# Patient Record
Sex: MALE | Race: WHITE | Employment: OTHER | URBAN - METROPOLITAN AREA
[De-identification: names, ages, dates, MRNs, and addresses within clinical notes are randomized per-mention and may not be internally consistent; named-entity substitution may affect disease eponyms.]

---

## 2017-01-10 ENCOUNTER — TELEPHONE (OUTPATIENT)
Dept: SURGERY | Facility: CLINIC | Age: 62
End: 2017-01-10

## 2017-01-10 ENCOUNTER — PRE VISIT (OUTPATIENT)
Dept: SURGERY | Facility: CLINIC | Age: 62
End: 2017-01-10

## 2017-01-10 ENCOUNTER — OFFICE VISIT (OUTPATIENT)
Dept: FAMILY MEDICINE | Facility: CLINIC | Age: 62
End: 2017-01-10
Payer: COMMERCIAL

## 2017-01-10 ENCOUNTER — OFFICE VISIT (OUTPATIENT)
Dept: SURGERY | Facility: CLINIC | Age: 62
End: 2017-01-10

## 2017-01-10 VITALS
OXYGEN SATURATION: 96 % | HEIGHT: 65 IN | WEIGHT: 207 LBS | HEART RATE: 86 BPM | DIASTOLIC BLOOD PRESSURE: 82 MMHG | TEMPERATURE: 98.3 F | SYSTOLIC BLOOD PRESSURE: 122 MMHG | BODY MASS INDEX: 34.49 KG/M2

## 2017-01-10 VITALS
SYSTOLIC BLOOD PRESSURE: 122 MMHG | HEART RATE: 86 BPM | DIASTOLIC BLOOD PRESSURE: 82 MMHG | OXYGEN SATURATION: 96 % | WEIGHT: 207 LBS | HEIGHT: 65 IN | BODY MASS INDEX: 34.49 KG/M2 | TEMPERATURE: 98.3 F

## 2017-01-10 DIAGNOSIS — K64.5 THROMBOSED EXTERNAL HEMORRHOIDS: Primary | ICD-10-CM

## 2017-01-10 PROCEDURE — 99213 OFFICE O/P EST LOW 20 MIN: CPT | Performed by: FAMILY MEDICINE

## 2017-01-10 ASSESSMENT — PAIN SCALES - GENERAL: PAINLEVEL: EXTREME PAIN (8)

## 2017-01-10 NOTE — MR AVS SNAPSHOT
After Visit Summary   1/10/2017    Sabino Espinal    MRN: [id number]           Patient Information     Date Of Birth          1955        Visit Information        Provider Department      1/10/2017 2:40 PM Cm Salmon MD [facility]        Today's Diagnoses     Thrombosed external hemorrhoids    -  1       Care Instructions      Thrombosed Hemorrhoids    Hemorrhoids are swollen veins in the lower rectum and anus. They're similar to varicose veins that form in the legs. Hemorrhoids can occur inside the rectum (internal hemorrhoids). Or one may form at the anal opening (external hemorrhoids). Although they may bleed, most hemorrhoids aren't cause for concern. But a small blood clot (thrombus) can develop in an external hemorrhoid. This may lead to severe pain and sometimes bleeding.  When to Go to the Emergency Room (ER)  If you have severe pain or excessive bleeding, seek immediate medical care.  What to Expect in the ER  A doctor is likely to check your anus and rectum using a slender, lighted tube (anoscope or proctoscope). A local anesthetic is given to ease any discomfort.  Treatment    If the blood clot has formed within the past 48 to 72 hours, your doctor may remove it from within the hemorrhoid. This is a simple procedure that can relieve pain. You will have a local anesthetic to keep you pain-free during the procedure. A small incision is made in the skin, and the blood clot is removed. Stitches are generally not needed.    If more than 72 hours have passed, your doctor will suggest home treatments. Simple home treatments can relieve your pain. These may include warm baths, ointments, suppositories, and witch hazel compresses. Many thrombosed hemorrhoids go away on their own in a few weeks.    If you have persistent bleeding or painful hemorrhoids, talk to your doctor about possible treatment with banding, ligation, or removal (hemorrhoidectomy).  Tips for  "Preventing Hemorrhoids    Eat foods that are high in fiber and use fiber supplements to help prevent constipation.    Drink plenty of liquids.    Get regular exercise to help prevent constipation and promote good bowel function.     4010-0348 [employer]. [address] [place] [address], [place] [address]. All rights reserved. This information is not intended as a substitute for professional medical care. Always follow your healthcare professional's instructions.              Follow-ups after your visit        Who to contact     If you have questions or need follow up information about today's clinic visit or your schedule please contact [facility] directly at [phone].  Normal or non-critical lab and imaging results will be communicated to you by MyChart, letter or phone within 4 business days after the clinic has received the results. If you do not hear from us within 7 days, please contact the clinic through [employer]hart or phone. If you have a critical or abnormal lab result, we will notify you by phone as soon as possible.  Submit refill requests through kontoblick or call your pharmacy and they will forward the refill request to us. Please allow 3 business days for your refill to be completed.          Additional Information About Your Visit        4TechharBlendagram Information     kontoblick lets you send messages to your doctor, view your test results, renew your prescriptions, schedule appointments and more. To sign up, go to [email or web address] . Click on \"Log in\" on the left side of the screen, which will take you to the Welcome page. Then click on \"Sign up Now\" on the right side of the page.     You will be asked to enter the access code listed below, as well as some personal information. Please follow the directions to create your username and password.     Your access code is: [email or web address]  Expires: 4/10/2017  3:01 PM     Your access code will  in 90 days. If you need help or a new code, " "please call your [place] clinic or [phone].        Care EveryWhere ID     This is your Care EveryWhere ID. This could be used by other organizations to access your [place] medical records  [id number]        Your Vitals Were     Pulse Temperature Height BMI (Body Mass Index) Pulse Oximetry       86 98.3  F (36.8  C) (Oral) 5' 5\" (1.651 m) 34.45 kg/m2 96%        Blood Pressure from Last 3 Encounters:   01/10/17 122/82   04/11/16 143/89   02/29/16 126/86    Weight from Last 3 Encounters:   01/10/17 207 lb (93.895 kg)   02/29/16 205 lb 12 oz (93.328 kg)   02/08/16 204 lb 12 oz (92.874 kg)              Today, you had the following     No orders found for display         Today's Medication Changes          These changes are accurate as of: 1/10/17  3:01 PM.  If you have any questions, ask your nurse or doctor.               Start taking these medicines.        Dose/Directions    COMPOUND - PHARMACY TO MIX COMPOUNDED MEDICATION   Commonly known as:  CMPD RX   Used for:  Thrombosed external hemorrhoids   Started by:  Cm Salmon MD        Dose:  1 applicator   Place 1 applicator rectally 2 times daily   Quantity:  30 g   Refills:  0         These medicines have changed or have updated prescriptions.        Dose/Directions    * HYDROCHLOROTHIAZIDE PO   This may have changed:  Another medication with the same name was changed. Make sure you understand how and when to take each.   Changed by:  Cm Salmon MD        Dose:  50 mg   Take 50 mg by mouth daily   Refills:  0       * hydrochlorothiazide 50 MG tablet   Commonly known as:  HYDRODIURIL   This may have changed:  how much to take   Used for:  Hypertension goal BP (blood pressure) < 140/90   Changed by:  Cm Salmon MD        Dose:  50 mg   Take 1 tablet (50 mg) by mouth daily   Quantity:  90 tablet   Refills:  3       * hydrochlorothiazide 25 MG tablet   Commonly known as:  HYDRODIURIL   This may have changed:  Another " medication with the same name was changed. Make sure you understand how and when to take each.   Used for:  Basal cell carcinoma of right cheek   Changed by:  Jacquelyn Blakely MD        Dose:  25 mg   Take 25 mg by mouth   Refills:  0       * Notice:  This list has 3 medication(s) that are the same as other medications prescribed for you. Read the directions carefully, and ask your doctor or other care provider to review them with you.         Where to get your medicines      These medications were sent to [facility] [address]  [address] [place], [place] [address] [place] [address]     Phone:  [phone]    - COMPOUND - PHARMACY TO MIX COMPOUNDED MEDICATION             Primary Care Provider Office Phone # Fax #    Cm Zaria Salmon [phone]       [address] [address]        Thank you!     Thank you for choosing [facility]  for your care. Our goal is always to provide you with excellent care. Hearing back from our patients is one way we can continue to improve our services. Please take a few minutes to complete the written survey that you may receive in the mail after your visit with us. Thank you!             Your Updated Medication List - Protect others around you: Learn how to safely use, store and throw away your medicines at www.disposemymeds.org.          This list is accurate as of: 1/10/17  3:01 PM.  Always use your most recent med list.                   Brand Name Dispense Instructions for use    aspirin 81 MG tablet      Take 81 mg by mouth daily       COMPOUND - PHARMACY TO MIX COMPOUNDED MEDICATION    CMPD RX    30 g    Place 1 applicator rectally 2 times daily       * HYDROCHLOROTHIAZIDE PO      Take 50 mg by mouth daily       * hydrochlorothiazide 50 MG tablet    HYDRODIURIL    90 tablet    Take 1 tablet (50 mg) by mouth daily       * hydrochlorothiazide 25 MG tablet    HYDRODIURIL      Take 25 mg by mouth       LANsoprazole 30 MG CR capsule    PREVACID         TOPIRAMATE PO      Take 50 mg by mouth 2 times daily       * Notice:  This list has 3 medication(s) that are the same as other medications prescribed for you. Read the directions carefully, and ask your doctor or other care provider to review them with you.

## 2017-01-10 NOTE — NURSING NOTE
"Chief Complaint   Patient presents with     Consult     Thrombosed hemorrhoid        Filed Vitals:    01/10/17 1615   BP: 122/82   Pulse: 86   Temp: 98.3  F (36.8  C)   TempSrc: Oral   Height: 5' 5\"   Weight: 207 lb   SpO2: 96%       Body mass index is 34.45 kg/(m^2).    Debbie Jarvis CMA                                                      "

## 2017-01-10 NOTE — MR AVS SNAPSHOT
After Visit Summary   1/10/2017    Sabino Espinal    MRN: [id number]           Patient Information     Date Of Birth          1955        Visit Information        Provider Department      1/10/2017 4:15 PM Susy Carson APRN CNP Colon and Rectal Surgery        Care Instructions    1. Citrucel or Metamucil 2-3 times a day  2. Add a laxative in addition as needed for constipation  3. Warm tub baths 3-4 times a day  4. Topical lidocaine as needed  5. Return to clinic if still symptomatic in 3-4 weeks or if symptoms worsen at any time        Follow-ups after your visit        Who to contact     Please call your clinic at [phone] to:    Ask questions about your health    Make or cancel appointments    Discuss your medicines    Learn about your test results    Speak to your doctor   If you have compliments or concerns about an experience at your clinic, or if you wish to file a complaint, please contact [facility] Patient Relations at [phone] or email us at [email or web address]         Additional Information About Your Visit        "[employer]hart Information     [employer] is an electronic gateway that provides easy, online access to your medical records. With [employer], you can request a clinic appointment, read your test results, renew a prescription or communicate with your care team.     To sign up for [employer] visit the website at [email or web address]   You will be asked to enter the access code listed below, as well as some personal information. Please follow the directions to create your username and password.     Your access code is: [email or web address]  Expires: 4/10/2017  3:01 PM     Your access code will  in 90 days. If you need help or a new code, please contact your [facility] or call [phone] for assistance.        Care EveryWhere ID     This is your Care EveryWhere ID. This could be used by other  "organizations to access your [place] medical records  [id number]        Your Vitals Were     Pulse Temperature Height BMI (Body Mass Index) Pulse Oximetry       86 98.3  F (36.8  C) (Oral) 5' 5\" 34.45 kg/m2 96%        Blood Pressure from Last 3 Encounters:   01/10/17 122/82   01/10/17 122/82   04/11/16 143/89    Weight from Last 3 Encounters:   01/10/17 207 lb   01/10/17 207 lb   02/29/16 205 lb 12 oz              Today, you had the following     No orders found for display         Today's Medication Changes          These changes are accurate as of: 1/10/17  4:53 PM.  If you have any questions, ask your nurse or doctor.               Start taking these medicines.        Dose/Directions    COMPOUND - PHARMACY TO MIX COMPOUNDED MEDICATION   Commonly known as:  CMPD RX   Used for:  Thrombosed external hemorrhoids   Started by:  Cm Salmon MD        Dose:  1 applicator   Place 1 applicator rectally 2 times daily   Quantity:  30 g   Refills:  0         These medicines have changed or have updated prescriptions.        Dose/Directions    hydrochlorothiazide 25 MG tablet   Commonly known as:  HYDRODIURIL   This may have changed:  Another medication with the same name was removed. Continue taking this medication, and follow the directions you see here.   Used for:  Basal cell carcinoma of right cheek   Changed by:  Jacquelyn Blakely MD        Dose:  25 mg   Take 25 mg by mouth   Refills:  0         Stop taking these medicines if you haven't already. Please contact your care team if you have questions.     LANsoprazole 30 MG CR capsule   Commonly known as:  PREVACID   Stopped by:  Susy Carson APRN CNP                Where to get your medicines      These medications were sent to [facility] [place], [place] - [address]  [address], [facility] [address]     Phone:  [phone]    - COMPOUND - PHARMACY TO MIX COMPOUNDED MEDICATION             Primary Care " Provider Office Phone # Fax #    Cm Zaria Salmon [phone]       [address] [address]        Thank you!     Thank you for choosing COLON [facility] RECTAL SURGERY  for your care. Our goal is always to provide you with excellent care. Hearing back from our patients is one way we can continue to improve our services. Please take a few minutes to complete the written survey that you may receive in the mail after your visit with us. Thank you!             Your Updated Medication List - Protect others around you: Learn how to safely use, store and throw away your medicines at www.disposemymeds.org.          This list is accurate as of: 1/10/17  4:53 PM.  Always use your most recent med list.                   Brand Name Dispense Instructions for use    aspirin 81 MG tablet      Take 81 mg by mouth daily       COMPOUND - PHARMACY TO MIX COMPOUNDED MEDICATION    CMPD RX    30 g    Place 1 applicator rectally 2 times daily       hydrochlorothiazide 25 MG tablet    HYDRODIURIL     Take 25 mg by mouth       TOPIRAMATE PO      Take 50 mg by mouth 2 times daily

## 2017-01-10 NOTE — PATIENT INSTRUCTIONS
Thrombosed Hemorrhoids    Hemorrhoids are swollen veins in the lower rectum and anus. They're similar to varicose veins that form in the legs. Hemorrhoids can occur inside the rectum (internal hemorrhoids). Or one may form at the anal opening (external hemorrhoids). Although they may bleed, most hemorrhoids aren't cause for concern. But a small blood clot (thrombus) can develop in an external hemorrhoid. This may lead to severe pain and sometimes bleeding.  When to Go to the Emergency Room (ER)  If you have severe pain or excessive bleeding, seek immediate medical care.  What to Expect in the ER  A doctor is likely to check your anus and rectum using a slender, lighted tube (anoscope or proctoscope). A local anesthetic is given to ease any discomfort.  Treatment    If the blood clot has formed within the past 48 to 72 hours, your doctor may remove it from within the hemorrhoid. This is a simple procedure that can relieve pain. You will have a local anesthetic to keep you pain-free during the procedure. A small incision is made in the skin, and the blood clot is removed. Stitches are generally not needed.    If more than 72 hours have passed, your doctor will suggest home treatments. Simple home treatments can relieve your pain. These may include warm baths, ointments, suppositories, and witch hazel compresses. Many thrombosed hemorrhoids go away on their own in a few weeks.    If you have persistent bleeding or painful hemorrhoids, talk to your doctor about possible treatment with banding, ligation, or removal (hemorrhoidectomy).  Tips for Preventing Hemorrhoids    Eat foods that are high in fiber and use fiber supplements to help prevent constipation.    Drink plenty of liquids.    Get regular exercise to help prevent constipation and promote good bowel function.     9355-9417 [employer]. [address] [place] [address], [place] [address]. All rights reserved. This information is not intended as a  substitute for professional medical care. Always follow your healthcare professional's instructions.

## 2017-01-10 NOTE — PROGRESS NOTES
SUBJECTIVE:                                                    Sabino Espinal is a 61 year old male who presents to clinic today for the following health issues:    Hemorrhoids      Duration: 1 week     Description:   Pain: YES- severe  Itching: YES- severe    Accompanying signs and symptoms:   Blood in stool: YES  Changes in stool pattern: no     History (similar episodes/previous evaluation): yes intermittent     Precipitating or alleviating factors: None    Therapies tried and outcome: preparation H outcome: effective for itching      History of hemorrhoid.   Painful  Can feel it.   Has multiple lesion.   Constipation.    Problem list and histories reviewed & adjusted, as indicated.  Additional history: as documented    Problem list, Medication list, Allergies, and Medical/Social/Surgical histories reviewed in [facility] and updated as appropriate.      Social History     Social History     Marital Status:      Spouse Name: Jacque     Number of Children: 3     Years of Education: 12     Occupational History     [employer] Retired     [employer]           Social History Main Topics     Smoking status: Former Smoker -- 1.00 packs/day for 10 years     Types: Cigarettes     Smokeless tobacco: Never Used      Comment: quit at age 25     Alcohol Use: Yes      Comment: rarely      Drug Use: No     Sexual Activity:     Partners: Female     Other Topics Concern     Parent/Sibling W/ Cabg, Mi Or Angioplasty Before 65f 55m? No     Caffeine Concern Not Asked     No caffeine     Exercise Not Asked     Walking 2 times a week. Hurts the knee to do more     Social History Narrative    Balanced Diet - Yes    Osteoporosis Preventative measures-  Dairy servings per day: 2-3    Regular Exercise -  No Describe walks a lot on the job    Dental Exam up - YES - Date: 6 months ago    Eye Exam - YES - Date: 1 yr ago    Self Testicular Exam -  No    Do you have any concerns about STD's -  No    Abuse: Current  "or Past (Physical, Sexual or Emotional)- No    Do you feel safe in your environment - Yes    Guns stored in the home - na    Sunscreen used - sometimes    Seatbelts used - Yes    Lipids - NO    Glucose -  NO    Colon Cancer Screening - No    Hemoccults - NO    PSA - NO    Digital Rectal Exam - yrs ago    Immunizations reviewed and up to date - ?    Marlene CARR             Allergies   Allergen Reactions     Oxycodone-Acetaminophen Rash     Patient Active Problem List   Diagnosis     Inguinal hernia     Generalized osteoarthrosis, unspecified site     Esophageal reflux     Hemochromatosis     HYPERLIPIDEMIA LDL GOAL <130     Hypertension goal BP (blood pressure) < 140/90     Tinnitus, bilateral     DJD (degenerative joint disease) of knee     BCC (basal cell carcinoma), face     Reviewed medications, social history and  past medical and surgical history.    Review of system: for general, respiratory, CVS, GI and psychiatry negative except for noted above.     EXAM:  /82 mmHg  Pulse 86  Temp(Src) 98.3  F (36.8  C) (Oral)  Ht 5' 5\" (1.651 m)  Wt 207 lb (93.895 kg)  BMI 34.45 kg/m2  SpO2 96%  Constitutional: healthy, alert and no distress   Psychiatric: mentation appears normal and affect normal/bright  : 2 external thrombosed hemorrhoid - one with tiny puncutre but clotted blood on it, tender to touch.     ASSESSMENT / PLAN:  (K64.5) Thrombosed external hemorrhoids  (primary encounter diagnosis)  Comment: discussed sitz bath, some studies with lidocaine and nifedipine vs nitro ointment. Discussed may need to see colorectal if worsening of pain or needs to go to ER to drain. discussed stool softner in diet.   Plan: COMPOUND (CMPD RX) - PHARMACY TO MIX COMPOUNDED        MEDICATION, COLORECTAL SURGERY REFERRAL               Patient Instructions       Thrombosed Hemorrhoids    Hemorrhoids are swollen veins in the lower rectum and anus. They're similar to varicose veins that form in the legs. Hemorrhoids can " occur inside the rectum (internal hemorrhoids). Or one may form at the anal opening (external hemorrhoids). Although they may bleed, most hemorrhoids aren't cause for concern. But a small blood clot (thrombus) can develop in an external hemorrhoid. This may lead to severe pain and sometimes bleeding.  When to Go to the Emergency Room (ER)  If you have severe pain or excessive bleeding, seek immediate medical care.  What to Expect in the ER  A doctor is likely to check your anus and rectum using a slender, lighted tube (anoscope or proctoscope). A local anesthetic is given to ease any discomfort.  Treatment    If the blood clot has formed within the past 48 to 72 hours, your doctor may remove it from within the hemorrhoid. This is a simple procedure that can relieve pain. You will have a local anesthetic to keep you pain-free during the procedure. A small incision is made in the skin, and the blood clot is removed. Stitches are generally not needed.    If more than 72 hours have passed, your doctor will suggest home treatments. Simple home treatments can relieve your pain. These may include warm baths, ointments, suppositories, and witch hazel compresses. Many thrombosed hemorrhoids go away on their own in a few weeks.    If you have persistent bleeding or painful hemorrhoids, talk to your doctor about possible treatment with banding, ligation, or removal (hemorrhoidectomy).  Tips for Preventing Hemorrhoids    Eat foods that are high in fiber and use fiber supplements to help prevent constipation.    Drink plenty of liquids.    Get regular exercise to help prevent constipation and promote good bowel function.     1633-8974 [employer]. [address] [place] [address], [place] [address]. All rights reserved. This information is not intended as a substitute for professional medical care. Always follow your healthcare professional's instructions.

## 2017-01-10 NOTE — TELEPHONE ENCOUNTER
1.  Date/reason for appt: 01/10/17 4:15PM THROMBOSED HEMORRHOID  2.  Referring provider: Cm Salmon MD   3.  Call to patient (Yes / No - short description): No, pt was referred.   4.  Previous care at / records requested from: Recs are in [facility] Cm Salmon - 1/10/17, Colonoscopy 02/14/2008

## 2017-01-10 NOTE — PATIENT INSTRUCTIONS
1. Citrucel or Metamucil 2-3 times a day  2. Add a laxative in addition as needed for constipation  3. Warm tub baths 3-4 times a day  4. Topical lidocaine as needed  5. Return to clinic if still symptomatic in 3-4 weeks or if symptoms worsen at any time

## 2017-01-10 NOTE — PROGRESS NOTES
"Colon and Rectal Surgery Consult Clinic Note    Date: 1/10/2017     Referring provider:  Cm Salmon MD  [place] [id number]     RE: Sabino Espinal  : 1955  JONATHAN: 1/10/2017    Sabino Espinal is a very pleasant 61 year old male without a significant past medical history with a recent diagnosis of thrombosed external hemorrhoids.  Given these findings they were subsequently sent to the [facility] for an opinion on this and a new patient consultation.     Mr. Espinal developed rectal pain 8 days ago. He believes this started after sitting too long on hard chairs. The following day he had a hemorrhoid that \"popped out\". He had quite a bit of bleeding from the hemorrhoid on Saturday and he thought his pain improved some on . He went back to work and does a lot of sitting as a  and his pain has not gotten worse but has not improved over the past few days. He reports that this pain is about 7-8/10. He has not noted any bleeding today. He denies any constipation or straining but has \"semi hard\" stools. He is otherwise healthy. He had a colonoscopy in . He is on daily Aspirin 81 mg but no other blood thinners. He denies any family history of any colon cancer.    Assessment/Plan: 61 year old male without a significant past medical history with resolving thrombosed external hemorrhoids. On exam he has two thrombosed external hemorrhoids in the left posterior position. One appears to have opened and drained some on its own. No necrosis. These are semi soft and appear to be starting to resolve. Discussed that these should continue to resolve on their own over the next few weeks. Pain has gotten slightly better and reassured him that this should improve over the next few days. Recommended warm tub baths several times a day, a fiber supplement and laxative as needed, topical lidocaine, and tylenol and ibuprofen for pain. " Did not recommend excision at this time as they are resolving on their own and pain is improving. If he develops another thrombosed hemorrhoid in the future, would recommend returning to clinic within 72 hours if he would like to consider excision. Will have him follow up in 3 weeks if he is still symptomatic or at anytime in the meantime if he develops any worsening symptoms.  Patient's questions were answered to his stated satisfaction and he is in agreement with this plan.    Medical history:  Past Medical History   Diagnosis Date     Esophageal reflux      Disorders of iron metabolism      Hemochromatosis      Hypertension goal BP (blood pressure) < 140/90      Hyperlipidemia LDL goal <130 5/9/2010     Tinnitus, bilateral      Has seen ENT     DJD (degenerative joint disease) of knee      Right Knee hurts worse     Basal cell carcinoma        Surgical history:  Past Surgical History   Procedure Laterality Date     Colonoscopy  2/14/2008     Normal     Hernia repair, inguinal rt/lt  1/3/2008     Left Hernia x 2     Mohs micrographic procedure         Problem list:  Patient Active Problem List    Diagnosis Date Noted     BCC (basal cell carcinoma), face 02/09/2016     Priority: Medium     DJD (degenerative joint disease) of knee      Priority: Medium     Right Knee hurts worse       Tinnitus, bilateral      Priority: Medium     Has seen ENT       Hypertension goal BP (blood pressure) < 140/90      Priority: Medium     Currently off meds. bp stable.        HYPERLIPIDEMIA LDL GOAL <130 05/09/2010     Priority: Medium     Hemochromatosis      Priority: Medium     Esophageal reflux 12/27/2007     Priority: Medium     Inguinal hernia 05/23/2005     Priority: Medium     Problem list name updated by automated process. Provider to review       Generalized osteoarthrosis, unspecified site 05/23/2005     Priority: Medium       Medications:  Current Outpatient Prescriptions   Medication Sig Dispense Refill     TOPIRAMATE PO  "Take 50 mg by mouth 2 times daily       COMPOUND (CMPD RX) - PHARMACY TO MIX COMPOUNDED MEDICATION Place 1 applicator rectally 2 times daily 30 g 0     hydrochlorothiazide (HYDRODIURIL) 25 MG tablet Take 25 mg by mouth       aspirin 81 MG tablet Take 81 mg by mouth daily         Allergies:  Allergies   Allergen Reactions     Oxycodone-Acetaminophen Rash       Family history:  Family History   Problem Relation Age of Onset     DIABETES Mother      Hypertension Mother      Hypertension Father      CEREBROVASCULAR DISEASE Father      Thyroid Disease Sister      Graves Disease     Thyroid Disease Sister      Graves Disease     Skin Cancer No family hx of        Social history:  Social History   Substance Use Topics     Smoking status: Former Smoker -- 1.00 packs/day for 10 years     Types: Cigarettes     Smokeless tobacco: Never Used      Comment: quit at age 25     Alcohol Use: Yes      Comment: rarely     Marital status: .  Occupation: .    Nursing Notes:   Debbie Jarvis CMA  1/10/2017  4:20 PM  Addendum  Chief Complaint   Patient presents with     Consult     Thrombosed hemorrhoid        Filed Vitals:    01/10/17 1615   BP: 122/82   Pulse: 86   Temp: 98.3  F (36.8  C)   TempSrc: Oral   Height: 5' 5\"   Weight: 207 lb   SpO2: 96%       Body mass index is 34.45 kg/(m^2).    Debbie Jarvis CMA                                                         Physical Examination:  /82 mmHg  Pulse 86  Temp(Src) 98.3  F (36.8  C) (Oral)  Ht 5' 5\"  Wt 207 lb  BMI 34.45 kg/m2  SpO2 96%  General: alert, oriented, in no acute distress, sitting comfortably  HEENT: mucous membranes moist  Perianal external examination:  Perianal skin: Intact with no excoriation or lichenification.  Lesions: No evidence of an external lesion, nodularity, or induration in the perianal region.  Eversion of buttocks: There was not evidence of an anal fissure. Details: N/A.  Skin tags or external hemorrhoids: Yes: two " 2-3 cm semi firm external hemorrhoids with purple discoloration. No necrosis and no bleeding present.   Digital rectal examination: Was deferred in order not to cause further trauma.    Anoscopy: Was deferred in order not to cause further trauma.    Total face to face time was 20 minutes, >50% counseling.    ALEX Bauer, NP-C  Colon and Rectal Surgery   [facility]    This note was created using speech recognition software and may contain unintended word substitutions.

## 2017-01-10 NOTE — Clinical Note
"1/10/2017      RE: Sabino Espinal  [address]  [facility] [place] [address]       Colon and Rectal Surgery Consult Clinic Note    Date: 1/10/2017     Referring provider:  Cm Salmon MD  [place] [id number] [place] [address]     RE: Sabino Espinal  : 1955  JONATHAN: 1/10/2017    Sabino Espinal is a very pleasant 61 year old male without a significant past medical history with a recent diagnosis of thrombosed external hemorrhoids.  Given these findings they were subsequently sent to the Colon and Rectal Surgery Clinic for an opinion on this and a new patient consultation.     Mr. Espinal developed rectal pain 8 days ago. He believes this started after sitting too long on hard chairs. The following day he had a hemorrhoid that \"popped out\". He had quite a bit of bleeding from the hemorrhoid on Saturday and he thought his pain improved some on . He went back to work and does a lot of sitting as a  and his pain has not gotten worse but has not improved over the past few days. He reports that this pain is about 7-8/10. He has not noted any bleeding today. He denies any constipation or straining but has \"semi hard\" stools. He is otherwise healthy. He had a colonoscopy in . He is on daily Aspirin 81 mg but no other blood thinners. He denies any family history of any colon cancer.    Assessment/Plan: 61 year old male without a significant past medical history with resolving thrombosed external hemorrhoids. On exam he has two thrombosed external hemorrhoids in the left posterior position. One appears to have opened and drained some on its own. No necrosis. These are semi soft and appear to be starting to resolve. Discussed that these should continue to resolve on their own over the next few weeks. Pain has gotten slightly better and reassured him that this should improve over the next few days. Recommended warm tub baths several times a day, a fiber " supplement and laxative as needed, topical lidocaine, and tylenol and ibuprofen for pain. Did not recommend excision at this time as they are resolving on their own and pain is improving. If he develops another thrombosed hemorrhoid in the future, would recommend returning to clinic within 72 hours if he would like to consider excision. Will have him follow up in 3 weeks if he is still symptomatic or at anytime in the meantime if he develops any worsening symptoms.  Patient's questions were answered to his stated satisfaction and he is in agreement with this plan.    Medical history:  Past Medical History   Diagnosis Date     Esophageal reflux      Disorders of iron metabolism      Hemochromatosis      Hypertension goal BP (blood pressure) < 140/90      Hyperlipidemia LDL goal <130 5/9/2010     Tinnitus, bilateral      Has seen ENT     DJD (degenerative joint disease) of knee      Right Knee hurts worse     Basal cell carcinoma        Surgical history:  Past Surgical History   Procedure Laterality Date     Colonoscopy  2/14/2008     Normal     Hernia repair, inguinal rt/lt  1/3/2008     Left Hernia x 2     Mohs micrographic procedure         Problem list:  Patient Active Problem List    Diagnosis Date Noted     BCC (basal cell carcinoma), face 02/09/2016     Priority: Medium     DJD (degenerative joint disease) of knee      Priority: Medium     Right Knee hurts worse       Tinnitus, bilateral      Priority: Medium     Has seen ENT       Hypertension goal BP (blood pressure) < 140/90      Priority: Medium     Currently off meds. bp stable.        HYPERLIPIDEMIA LDL GOAL <130 05/09/2010     Priority: Medium     Hemochromatosis      Priority: Medium     Esophageal reflux 12/27/2007     Priority: Medium     Inguinal hernia 05/23/2005     Priority: Medium     Problem list name updated by automated process. Provider to review       Generalized osteoarthrosis, unspecified site 05/23/2005     Priority: Medium  "      Medications:  Current Outpatient Prescriptions   Medication Sig Dispense Refill     TOPIRAMATE PO Take 50 mg by mouth 2 times daily       COMPOUND (CMPD RX) - PHARMACY TO MIX COMPOUNDED MEDICATION Place 1 applicator rectally 2 times daily 30 g 0     hydrochlorothiazide (HYDRODIURIL) 25 MG tablet Take 25 mg by mouth       aspirin 81 MG tablet Take 81 mg by mouth daily         Allergies:  Allergies   Allergen Reactions     Oxycodone-Acetaminophen Rash       Family history:  Family History   Problem Relation Age of Onset     DIABETES Mother      Hypertension Mother      Hypertension Father      CEREBROVASCULAR DISEASE Father      Thyroid Disease Sister      Graves Disease     Thyroid Disease Sister      Graves Disease     Skin Cancer No family hx of        Social history:  Social History   Substance Use Topics     Smoking status: Former Smoker -- 1.00 packs/day for 10 years     Types: Cigarettes     Smokeless tobacco: Never Used      Comment: quit at age 25     Alcohol Use: Yes      Comment: rarely     Marital status: .  Occupation: .    Nursing Notes:   Debbie Jarvis CMA  1/10/2017  4:20 PM  Addendum  Chief Complaint   Patient presents with     Consult     Thrombosed hemorrhoid        Filed Vitals:    01/10/17 1615   BP: 122/82   Pulse: 86   Temp: 98.3  F (36.8  C)   TempSrc: Oral   Height: 5' 5\"   Weight: 207 lb   SpO2: 96%       Body mass index is 34.45 kg/(m^2).    Debbie Jarvis CMA                                                         Physical Examination:  /82 mmHg  Pulse 86  Temp(Src) 98.3  F (36.8  C) (Oral)  Ht 5' 5\"  Wt 207 lb  BMI 34.45 kg/m2  SpO2 96%  General: alert, oriented, in no acute distress, sitting comfortably  HEENT: mucous membranes moist  Perianal external examination:  Perianal skin: Intact with no excoriation or lichenification.  Lesions: No evidence of an external lesion, nodularity, or induration in the perianal region.  Eversion of buttocks: " There was not evidence of an anal fissure. Details: N/A.  Skin tags or external hemorrhoids: Yes: two 2-3 cm semi firm external hemorrhoids with purple discoloration. No necrosis and no bleeding present.   Digital rectal examination: Was deferred in order not to cause further trauma.    Anoscopy: Was deferred in order not to cause further trauma.    Total face to face time was 20 minutes, >50% counseling.    ALEX Bauer, NP-C  Colon and Rectal Surgery   [facility]    This note was created using speech recognition software and may contain unintended word substitutions.        ALEX Bauer CNP

## 2017-01-10 NOTE — TELEPHONE ENCOUNTER
"Sabino is calling for colorectal appointment for hemorrhoids.  He reports his PCP told him today they were thrombosed.  He reports symptoms for a week, have progressively gotten worse.  Originally was one external, now feels like a cluster on the outside.  Has been treating with warm baths, preparation H, medicated tucks.  Denies \"hard BM's but not exactly soft\".  Advised adding fiber supplement to diet.  PCP advised stool softener today.  Pain rated at =8/10 and constant.  Hurts to walk.  =8/10 yesterday and since Friday.  Saturday pretty bad bleeding, Sunday less bleeding.  No significant bleeding since then.    I spoke with Susy Layne.  If he can get here within 30 minutes will add on today ( by 4:20).  I spoke with Sabino, he thinks he can do that.  On his way.  "

## 2017-01-10 NOTE — NURSING NOTE
"Chief Complaint   Patient presents with     Hemorrhoids       Initial /82 mmHg  Pulse 86  Temp(Src) 98.3  F (36.8  C) (Oral)  Ht 5' 5\" (1.651 m)  Wt 207 lb (93.895 kg)  BMI 34.45 kg/m2  SpO2 96% Estimated body mass index is 34.45 kg/(m^2) as calculated from the following:    Height as of this encounter: 5' 5\" (1.651 m).    Weight as of this encounter: 207 lb (93.895 kg).  BP completed using cuff size: jennifer Auguste CMA      "

## 2017-01-23 ENCOUNTER — TRANSFERRED RECORDS (OUTPATIENT)
Dept: HEALTH INFORMATION MANAGEMENT | Facility: CLINIC | Age: 62
End: 2017-01-23

## 2017-01-25 ENCOUNTER — THERAPY VISIT (OUTPATIENT)
Dept: PHYSICAL THERAPY | Facility: CLINIC | Age: 62
End: 2017-01-25
Payer: COMMERCIAL

## 2017-01-25 DIAGNOSIS — M54.2 CERVICALGIA: Primary | ICD-10-CM

## 2017-01-25 DIAGNOSIS — G44.209 TENSION HEADACHE: ICD-10-CM

## 2017-01-25 PROCEDURE — 97112 NEUROMUSCULAR REEDUCATION: CPT | Mod: GP | Performed by: PHYSICAL THERAPIST

## 2017-01-25 PROCEDURE — 97161 PT EVAL LOW COMPLEX 20 MIN: CPT | Mod: GP | Performed by: PHYSICAL THERAPIST

## 2017-01-25 PROCEDURE — 97110 THERAPEUTIC EXERCISES: CPT | Mod: GP | Performed by: PHYSICAL THERAPIST

## 2017-01-25 NOTE — Clinical Note
[facility] PHYSICAL THERAPY  [address]  [facility] [address]  [phone]    2017    Re: Sabino Espinal   :   1955  MRN:  [id number]   REFERRING PHYSICIAN:   Joana Hinson    [facility] PHYSICAL [facility]    Date of Initial Evaluation:  2017  Visits: 1 Rxs Used: 1  Reason for Referral:     Cervicalgia  Tension headache  Physical Therapy Initial Evaluation   17   Precautions/Restrictions/MD instructions: PT eval and treat   Therapist Impression:   Pt is a 62 y/o male, with chorinc history of neck pain and headaches (magraine) . Pt presents with neck pain, decreased ROM/mobility, and decreased stabilty. These impairments limit their ability to drive, read and work part-time as a . Skilled PT services necessary in order to reduce impairments and improve independent function.  Subjective:   Chief Complaint: Neck pain and headaches (suboccipatal); has seen vertigo specialists (no BPPV).   DOI/onset: 16 DOS: n/a  Location: subocciptal and neck pain  Quality: dull, achy  Frequency: daily headaches if no aleve and migraine headaches Radiates: none  Pain scale: Rest 4/10 Activity 10/10    Sleeping: intermittently    Exacerbated by: reading, light sensitivity, noise/movment sensitivity  Relieved by: Aleve, ice, and migraine h/a meds  Progression: none change (only helps with meds)  Previous Treatment: vertigo specialist, [facility] Effect of prior treatment: minimal help   PMH and/or surgical history: knee surgery and hernia   Imaging: MRI (); indicated DDD and herniated discs    Re: Sabino Espinal   :   1955    Occupation: part time,  Job duties: driving/sitting    Current HEP/exercise regimen: none to note Patient's goals: not take meds and be painfree   Medications: migraine (2X/day); Aleve 3X/day, and aspirin   General health as reported by patient: good    Return to MD: as needed  Red Flags: none to note              Objective:  CERVICAL:  Posture: slightly forward head, rounded shoulders  Neurological:  Motor Deficit:  Myotomes L R   C4 (shoulder elevation) 5 55   C5 (shoulder abduction) 5   C6 (elbow flexion)    C7 (elbow extension)    C8 (thumb extension)    T1 (finger add/abd)     Strength (lb) nt nt   Sensory Deficit, Reflexes, Dural Signs: same, intact and symmetrical bilaterally  AROM: (Major, Moderate, Minimal or Nil loss)  Movement Loss Boubacar Mod Min Nil Pain   Protrusion    X No pain   Flexion   X  Center/Subocciptal   Retraction  X   Stretch/ no pain   Extension X    Center/Subocciptal   Left Rotation  X   No pain/tightness   Right Rotation  X   No pain/ tightness   Left Side Bending  X   No pain/tightness   Right Side bending  X   No pain/tightness   Other Tests:   - Supine: Deep Neck Flexor Activation: poor/fair  - Spinal mobility:     T-spine- very sensative to PA/spring test over T1-T8     C-spine- very sensative to PA over C2, C4, C6  System        Re: Sabino Espinal   :   1955    Assessment/Plan:      Patient is a 61 year old male with cervical and headache complaints.    Patient has the following significant findings with corresponding treatment plan.                Diagnosis 1:  Neck pain and headahes  Pain -  hot/cold therapy, manual therapy, self management, education, directional preference exercise and home program  Decreased ROM/flexibility - manual therapy and therapeutic exercise  Decreased joint mobility - manual therapy and therapeutic exercise  Decreased strength - therapeutic exercise and therapeutic activities  Inflammation - cold therapy and self management/home program  Impaired posture - neuro re-education  Instability -  Therapeutic Activity  Therapeutic Exercise    Therapy Evaluation Codes:   1) History comprised of:   Personal factors that impact the plan of care:      None.     Comorbidity factors that impact the plan of care are:      Dizziness.     Medications impacting care: Anti-inflammatory, Muscle relaxant and Pain.  2) Examination of Body Systems comprised of:   Body structures and functions that impact the plan of care:      Cervical spine and Head.   Activity limitations that impact the plan of care are:      Driving, Reading/Computer work and Working.  3) Clinical presentation characteristics are:   Stable/Uncomplicated.  4) Decision-Making    Low complexity using standardized patient assessment instrument and/or measureable assessment of functional outcome.  Cumulative Therapy Evaluation is: Low complexity.    Previous and current functional limitations:  (See Goal Flow Sheet for this information)    Short term and Long term goals: (See Goal Flow Sheet for this information)   Communication ability:  Patient appears to be able to clearly communicate and understand verbal and written communication and follow directions correctly.  Treatment Explanation - The following has been discussed with the patient:   RX ordered/plan of care  Anticipated outcomes  Possible risks and side effects  This patient would benefit from PT intervention to resume normal activities.   Rehab potential is good.  Frequency:  1 X week, once daily  Duration:  for 2 weeks, then 1X/every 2 weeks for 4 weeks, to 1X/mo for 2 months  Discharge Plan:  Achieve all LTG.    Re: Sabino Espinal   :   1955    Independent in home treatment program.  Reach maximal therapeutic benefit.      Thank you for your referral.    INQUIRIES  Therapist: Patricia Del Cid DPT  [facility]  [address] [place], [place] [address] [address]  Phone: [phone]  Fax: [phone]

## 2017-01-25 NOTE — PROGRESS NOTES
Subjective:  Physical Therapy Initial Evaluation   1/25/17   Precautions/Restrictions/MD instructions: PT eval and treat   Therapist Impression:   Pt is a 60 y/o male, with chorinc history of neck pain and headaches (magraine) . Pt presents with neck pain, decreased ROM/mobility, and decreased stabilty. These impairments limit their ability to drive, read and work part-time as a . Skilled PT services necessary in order to reduce impairments and improve independent function.    Subjective:   Chief Complaint: Neck pain and headaches (suboccipatal); has seen vertigo specialists (no BPPV).   DOI/onset: 1/22/16 DOS: n/a  Location: subocciptal and neck pain  Quality: dull, achy  Frequency: daily headaches if no aleve and migraine headaches Radiates: none  Pain scale: Rest 4/10 Activity 10/10    Sleeping: intermittently    Exacerbated by: reading, light sensitivity, noise/movment sensitivity  Relieved by: Aleve, ice, and migraine h/a meds  Progression: none change (only helps with meds)  Previous Treatment: vertigo specialist, [facility] Effect of prior treatment: minimal help   PMH and/or surgical history: knee surgery and hernia   Imaging: MRI (2016); indicated DDD and herniated discs    Occupation: part time,  Job duties: driving/sitting    Current HEP/exercise regimen: none to note Patient's goals: not take meds and be painfree   Medications: migraine (2X/day); Aleve 3X/day, and aspirin   General health as reported by patient: good   Return to MD: as needed  Red Flags: none to note    HPI                    Objective:  CERVICAL:    Posture: slightly forward head, rounded shoulders    Neurological:    Motor Deficit:  Myotomes L R   C4 (shoulder elevation) 5/5 5/5   C5 (shoulder abduction) 5/5 5/5   C6 (elbow flexion) 5/5 5/5   C7 (elbow extension) 5/5 5/5   C8 (thumb extension) 5/5 5/5   T1 (finger add/abd) 5/5 5/5    Strength (lb) nt nt     Sensory Deficit, Reflexes, Dural Signs:  same, intact and symmetrical bilaterally    AROM: (Major, Moderate, Minimal or Nil loss)  Movement Loss Boubacar Mod Min Nil Pain   Protrusion    X No pain   Flexion   X  Center/Subocciptal   Retraction  X   Stretch/ no pain   Extension X    Center/Subocciptal   Left Rotation  X   No pain/tightness   Right Rotation  X   No pain/ tightness   Left Side Bending  X   No pain/tightness   Right Side bending  X   No pain/tightness       Other Tests:   - Supine: Deep Neck Flexor Activation: poor/fair  - Spinal mobility:     T-spine- very sensative to PA/spring test over T1-T8     C-spine- very sensative to PA over C2, C4, C6  System    Physical Exam    General     ROS    Assessment/Plan:      Patient is a 61 year old male with cervical and headache complaints.    Patient has the following significant findings with corresponding treatment plan.                Diagnosis 1:  Neck pain and headahes  Pain -  hot/cold therapy, manual therapy, self management, education, directional preference exercise and home program  Decreased ROM/flexibility - manual therapy and therapeutic exercise  Decreased joint mobility - manual therapy and therapeutic exercise  Decreased strength - therapeutic exercise and therapeutic activities  Inflammation - cold therapy and self management/home program  Impaired posture - neuro re-education  Instability -  Therapeutic Activity  Therapeutic Exercise    Therapy Evaluation Codes:   1) History comprised of:   Personal factors that impact the plan of care:      None.    Comorbidity factors that impact the plan of care are:      Dizziness.     Medications impacting care: Anti-inflammatory, Muscle relaxant and Pain.  2) Examination of Body Systems comprised of:   Body structures and functions that impact the plan of care:      Cervical spine and Head.   Activity limitations that impact the plan of care are:      Driving, Reading/Computer work and Working.  3) Clinical presentation characteristics  are:   Stable/Uncomplicated.  4) Decision-Making    Low complexity using standardized patient assessment instrument and/or measureable assessment of functional outcome.  Cumulative Therapy Evaluation is: Low complexity.    Previous and current functional limitations:  (See Goal Flow Sheet for this information)    Short term and Long term goals: (See Goal Flow Sheet for this information)     Communication ability:  Patient appears to be able to clearly communicate and understand verbal and written communication and follow directions correctly.  Treatment Explanation - The following has been discussed with the patient:   RX ordered/plan of care  Anticipated outcomes  Possible risks and side effects  This patient would benefit from PT intervention to resume normal activities.   Rehab potential is good.    Frequency:  1 X week, once daily  Duration:  for 2 weeks, then 1X/every 2 weeks for 4 weeks, to 1X/mo for 2 months  Discharge Plan:  Achieve all LTG.  Independent in home treatment program.  Reach maximal therapeutic benefit.    Please refer to the daily flowsheet for treatment today, total treatment time and time spent performing 1:1 timed codes.

## 2017-02-01 ENCOUNTER — THERAPY VISIT (OUTPATIENT)
Dept: PHYSICAL THERAPY | Facility: CLINIC | Age: 62
End: 2017-02-01
Payer: COMMERCIAL

## 2017-02-01 DIAGNOSIS — G44.209 TENSION HEADACHE: ICD-10-CM

## 2017-02-01 DIAGNOSIS — M54.2 CERVICALGIA: Primary | ICD-10-CM

## 2017-02-01 PROCEDURE — 97112 NEUROMUSCULAR REEDUCATION: CPT | Mod: GP | Performed by: PHYSICAL THERAPIST

## 2017-02-01 PROCEDURE — 97140 MANUAL THERAPY 1/> REGIONS: CPT | Mod: GP | Performed by: PHYSICAL THERAPIST

## 2017-02-02 ENCOUNTER — OFFICE VISIT (OUTPATIENT)
Dept: SURGERY | Facility: CLINIC | Age: 62
End: 2017-02-02

## 2017-02-02 VITALS
HEART RATE: 72 BPM | HEIGHT: 65 IN | DIASTOLIC BLOOD PRESSURE: 98 MMHG | BODY MASS INDEX: 34.29 KG/M2 | WEIGHT: 205.8 LBS | SYSTOLIC BLOOD PRESSURE: 142 MMHG | OXYGEN SATURATION: 95 %

## 2017-02-02 DIAGNOSIS — L29.0 PRURITUS ANI: Primary | ICD-10-CM

## 2017-02-02 ASSESSMENT — PAIN SCALES - GENERAL: PAINLEVEL: WORST PAIN (10)

## 2017-02-02 NOTE — MR AVS SNAPSHOT
After Visit Summary   2/2/2017    Sabino Espinal    MRN: [id number]           Patient Information     Date Of Birth          1955        Visit Information        Provider Department      2/2/2017 2:15 PM Susy Carson APRN [facility] Colon and Rectal Surgery        Care Instructions    1) Fold a gauze in the buttock crease and determine whether there is any fecal staining that could be contributing and to keep area dry  2) Calmoseptine cream or zinc cream once to twice a day  3) Avoid scratching to avoid further trauma  4) Increase fiber and water in diet   5) Diet modification-try avoiding spicy foods, caffeine, tomatoes, carbonated beverages, milk products, cheese, chocolate, nuts, etc.  6) Use wet wipes rather than toilet paper. May also try washing and then using a blow dryer to avoid trauma of wiping with toilet paper.   7) Try wearing only cotton underwear   8) Avoid perfume-containing soaps (can try Dove soap).  9) Fiber supplement such Citrucel 2-3 times a day to bulk up stools            Follow-ups after your visit        Your next 10 appointments already scheduled     Feb 08, 2017  2:20 PM   V[facility] Spine with Patricia Del Cid PT   [facility] ([facility]  )    [address] [place] [address] [address] [place] [id number] [phone]              Who to contact     Please call your clinic at [phone] to:    Ask questions about your health    Make or cancel appointments    Discuss your medicines    Learn about your test results    Speak to your doctor   If you have compliments or concerns about an experience at your clinic, or if you wish to file a complaint, please contact [facility] Patient Relations at [phone] or email us at [email or web address]         Additional Information About Your Visit        MyChart Information     [employer]Helpers is an electronic gateway that provides easy, online access  "to your medical records. With Hyperoptic, you can request a clinic appointment, read your test results, renew a prescription or communicate with your care team.     To sign up for Hyperoptic visit the website at [email or web address]   You will be asked to enter the access code listed below, as well as some personal information. Please follow the directions to create your username and password.     Your access code is: [email or web address]  Expires: 4/10/2017  3:01 PM     Your access code will  in 90 days. If you need help or a new code, please contact your [facility] or call [phone] for assistance.        Care EveryWhere ID     This is your Care EveryWhere ID. This could be used by other organizations to access your [place] medical records  [id number]        Your Vitals Were     Pulse Height BMI (Body Mass Index) Pulse Oximetry          72 5' 5\" 34.25 kg/m2 95%         Blood Pressure from Last 3 Encounters:   17 142/98   01/10/17 122/82   01/10/17 122/82    Weight from Last 3 Encounters:   17 205 lb 12.8 oz   01/10/17 207 lb   01/10/17 207 lb              Today, you had the following     No orders found for display         Today's Medication Changes          These changes are accurate as of: 17  2:24 PM.  If you have any questions, ask your nurse or doctor.               Stop taking these medicines if you haven't already. Please contact your care team if you have questions.     COMPOUND - PHARMACY TO MIX COMPOUNDED MEDICATION   Commonly known as:  [facility]   Stopped by:  Susy Carson APRN CNP                    Primary Care Provider Office Phone # Fax #    Cm Zaria Salmon [phone]       [address] [address]        Thank you!     Thank you for choosing [facility] COLON AND RECTAL SURGERY  for your care. Our goal is always to provide you with excellent care. Hearing back from " our patients is one way we can continue to improve our services. Please take a few minutes to complete the written survey that you may receive in the mail after your visit with us. Thank you!             Your Updated Medication List - Protect others around you: Learn how to safely use, store and throw away your medicines at www.disposemymeds.org.          This list is accurate as of: 2/2/17  2:24 PM.  Always use your most recent med list.                   Brand Name Dispense Instructions for use    aspirin 81 MG tablet      Take 81 mg by mouth daily       hydrochlorothiazide 25 MG tablet    HYDRODIURIL     Take 25 mg by mouth       TOPIRAMATE PO      Take 50 mg by mouth 2 times daily

## 2017-02-02 NOTE — NURSING NOTE
"Chief Complaint   Patient presents with     RECHECK     F/u hemorrhoids       Filed Vitals:    02/02/17 1404   BP: 142/98   Pulse: 72   Height: 5' 5\"   Weight: 205 lb 12.8 oz   SpO2: 95%       Body mass index is 34.25 kg/(m^2).  Debbie Jarvis CMA                      "

## 2017-02-02 NOTE — PROGRESS NOTES
Colon and Rectal Surgery Consult Clinic Note     Referring provider:  Cm Salmon MD  [place] [id number]     RE: Sabino Espinal  : 1955  JONATHAN: 2017    Sabino Espinal is a very pleasant 61 year old male without a significant past medical history who was seen in clinic on 1/10/17 with thrombosed external hemorrhoids. He presents today for follow up.    Mr. Espinal feels that his hemorrhoids have gone down but he continues to have a lot of pain. He has a lot of burning especially with and after bowel movements and states that his bottom is red and very sore. It is difficult for him to work due to the discomfort. 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 He has been on Benefiber and is having very soft bowel movements.  He had a colonoscopy in . He denies any family history of any colon cancer.    Assessment/Plan: 61 year old male without a significant past medical history with pruritus ani. On exam thrombosed external hemorrhoids are completely resolved. He has significant perianal excoriation and erythema with some moisture present. Recommended bulking up his stools with Citrucel as it bulks better than Benefiber. Advised keeping gauze tucked between his buttocks and using Calmoseptine or a zinc based cream to protect and heal his skin. Recommended stopping hemorrhoid creams and wipes which he has been using and to use baby wipes instead of toilet paper. Dove only soap and do not wash with soap between buttocks. Cotton only underwear. Would like him to try this for the next 3-4 weeks and return to clinic with any further symptoms.   Patient's questions were answered to his stated satisfaction and he is in agreement with this plan.    Medical history:  Past Medical History   Diagnosis Date     Esophageal reflux      Disorders of iron metabolism      Hemochromatosis      Hypertension goal BP (blood pressure) < 140/90       Hyperlipidemia LDL goal <130 5/9/2010     Tinnitus, bilateral      Has seen ENT     DJD (degenerative joint disease) of knee      Right Knee hurts worse     Basal cell carcinoma        Surgical history:  Past Surgical History   Procedure Laterality Date     Colonoscopy  2/14/2008     Normal     Hernia repair, inguinal rt/lt  1/3/2008     Left Hernia x 2     Mohs micrographic procedure         Problem list:    Patient Active Problem List    Diagnosis Date Noted     Cervicalgia 01/25/2017     Priority: Medium     Tension headache 01/25/2017     Priority: Medium     BCC (basal cell carcinoma), face 02/09/2016     Priority: Medium     DJD (degenerative joint disease) of knee      Priority: Medium     Right Knee hurts worse       Tinnitus, bilateral      Priority: Medium     Has seen ENT       Hypertension goal BP (blood pressure) < 140/90      Priority: Medium     Currently off meds. bp stable.        HYPERLIPIDEMIA LDL GOAL <130 05/09/2010     Priority: Medium     Hemochromatosis      Priority: Medium     Esophageal reflux 12/27/2007     Priority: Medium     Inguinal hernia 05/23/2005     Priority: Medium     Problem list name updated by automated process. Provider to review       Generalized osteoarthrosis, unspecified site 05/23/2005     Priority: Medium       Medications:  Current Outpatient Prescriptions   Medication Sig Dispense Refill     TOPIRAMATE PO Take 50 mg by mouth 2 times daily       hydrochlorothiazide (HYDRODIURIL) 25 MG tablet Take 25 mg by mouth       aspirin 81 MG tablet Take 81 mg by mouth daily         Allergies:  Allergies   Allergen Reactions     Oxycodone-Acetaminophen Rash       Family history:  Family History   Problem Relation Age of Onset     DIABETES Mother      Hypertension Mother      Hypertension Father      CEREBROVASCULAR DISEASE Father      Thyroid Disease Sister      Graves Disease     Thyroid Disease Sister      Graves Disease     Skin Cancer No family hx of        Social  "history:  Social History   Substance Use Topics     Smoking status: Former Smoker -- 1.00 packs/day for 10 years     Types: Cigarettes     Smokeless tobacco: Never Used      Comment: quit at age 25     Alcohol Use: Yes      Comment: rarely     Marital status: .  Occupation: .    Nursing Notes:   Debbie Jarvis CMA  2/2/2017  2:07 PM  Signed  Chief Complaint   Patient presents with     RECHECK     F/u hemorrhoids       Filed Vitals:    02/02/17 1404   BP: 142/98   Pulse: 72   Height: 5' 5\"   Weight: 205 lb 12.8 oz   SpO2: 95%       Body mass index is 34.25 kg/(m^2).  Debbie Jarvis CMA                           Physical Examination:  /98 mmHg  Pulse 72  Ht 5' 5\"  Wt 205 lb 12.8 oz  BMI 34.25 kg/m2  SpO2 95%  General: alert, oriented, in no acute distress, sitting comfortably  HEENT: mucous membranes moist  Perianal external examination:  Perianal skin: Excoriation of perianal skin with moisture present. Calmoseptine cream applied.  Lesions: No evidence of an external lesion, nodularity, or induration in the perianal region.  Eversion of buttocks: There was not evidence of an anal fissure. Details: N/A.  Skin tags or external hemorrhoids: None  Digital rectal examination: Was deferred in order not to cause further trauma.    Anoscopy: Was deferred in order not to cause further trauma.    Total face to face time was 15 minutes, >50% counseling.    ALEX Bauer, NP-C  Colon and Rectal Surgery   [facility]    This note was created using speech recognition software and may contain unintended word substitutions.    "

## 2017-02-02 NOTE — PATIENT INSTRUCTIONS
1) Fold a gauze in the buttock crease and determine whether there is any fecal staining that could be contributing and to keep area dry  2) Calmoseptine cream or zinc cream once to twice a day  3) Avoid scratching to avoid further trauma  4) Increase fiber and water in diet   5) Diet modification-try avoiding spicy foods, caffeine, tomatoes, carbonated beverages, milk products, cheese, chocolate, nuts, etc.  6) Use wet wipes rather than toilet paper. May also try washing and then using a blow dryer to avoid trauma of wiping with toilet paper.   7) Try wearing only cotton underwear   8) Avoid perfume-containing soaps (can try Dove soap).  9) Fiber supplement such Citrucel 2-3 times a day to bulk up stools

## 2017-02-08 ENCOUNTER — THERAPY VISIT (OUTPATIENT)
Dept: PHYSICAL THERAPY | Facility: CLINIC | Age: 62
End: 2017-02-08
Payer: COMMERCIAL

## 2017-02-08 DIAGNOSIS — G44.209 TENSION HEADACHE: ICD-10-CM

## 2017-02-08 DIAGNOSIS — M54.2 CERVICALGIA: Primary | ICD-10-CM

## 2017-02-08 PROCEDURE — 97140 MANUAL THERAPY 1/> REGIONS: CPT | Mod: GP | Performed by: PHYSICAL THERAPIST

## 2017-02-08 PROCEDURE — 97112 NEUROMUSCULAR REEDUCATION: CPT | Mod: GP | Performed by: PHYSICAL THERAPIST

## 2017-02-22 ENCOUNTER — THERAPY VISIT (OUTPATIENT)
Dept: PHYSICAL THERAPY | Facility: CLINIC | Age: 62
End: 2017-02-22
Payer: COMMERCIAL

## 2017-02-22 DIAGNOSIS — M54.2 CERVICALGIA: ICD-10-CM

## 2017-02-22 DIAGNOSIS — G44.209 TENSION HEADACHE: ICD-10-CM

## 2017-02-22 PROCEDURE — 97140 MANUAL THERAPY 1/> REGIONS: CPT | Mod: GP | Performed by: PHYSICAL THERAPIST

## 2017-02-22 PROCEDURE — 97112 NEUROMUSCULAR REEDUCATION: CPT | Mod: GP | Performed by: PHYSICAL THERAPIST

## 2017-02-22 PROCEDURE — 97110 THERAPEUTIC EXERCISES: CPT | Mod: GP | Performed by: PHYSICAL THERAPIST

## 2017-03-14 ENCOUNTER — THERAPY VISIT (OUTPATIENT)
Dept: PHYSICAL THERAPY | Facility: CLINIC | Age: 62
End: 2017-03-14
Payer: COMMERCIAL

## 2017-03-14 DIAGNOSIS — M54.2 CERVICALGIA: ICD-10-CM

## 2017-03-14 DIAGNOSIS — G44.209 TENSION HEADACHE: ICD-10-CM

## 2017-03-14 PROCEDURE — 97140 MANUAL THERAPY 1/> REGIONS: CPT | Mod: GP | Performed by: PHYSICAL THERAPIST

## 2017-03-14 PROCEDURE — 97110 THERAPEUTIC EXERCISES: CPT | Mod: GP | Performed by: PHYSICAL THERAPIST

## 2017-03-14 PROCEDURE — 97112 NEUROMUSCULAR REEDUCATION: CPT | Mod: GP | Performed by: PHYSICAL THERAPIST

## 2017-09-11 ENCOUNTER — OFFICE VISIT (OUTPATIENT)
Dept: FAMILY MEDICINE | Facility: CLINIC | Age: 62
End: 2017-09-11
Payer: COMMERCIAL

## 2017-09-11 VITALS
WEIGHT: 201 LBS | BODY MASS INDEX: 33.45 KG/M2 | TEMPERATURE: 97.5 F | OXYGEN SATURATION: 94 % | DIASTOLIC BLOOD PRESSURE: 82 MMHG | SYSTOLIC BLOOD PRESSURE: 124 MMHG | HEART RATE: 69 BPM

## 2017-09-11 DIAGNOSIS — G44.209 TENSION HEADACHE: ICD-10-CM

## 2017-09-11 DIAGNOSIS — E78.5 HYPERLIPIDEMIA LDL GOAL <130: ICD-10-CM

## 2017-09-11 DIAGNOSIS — E83.119 HEMOCHROMATOSIS, UNSPECIFIED HEMOCHROMATOSIS TYPE: Primary | ICD-10-CM

## 2017-09-11 DIAGNOSIS — Z11.59 NEED FOR HEPATITIS C SCREENING TEST: ICD-10-CM

## 2017-09-11 DIAGNOSIS — I10 HYPERTENSION GOAL BP (BLOOD PRESSURE) < 140/90: ICD-10-CM

## 2017-09-11 LAB
ALBUMIN SERPL-MCNC: 3.8 G/DL (ref 3.4–5)
ALP SERPL-CCNC: 58 U/L (ref 40–150)
ALT SERPL W P-5'-P-CCNC: 25 U/L (ref 0–70)
ANION GAP SERPL CALCULATED.3IONS-SCNC: 8 MMOL/L (ref 3–14)
AST SERPL W P-5'-P-CCNC: 18 U/L (ref 0–45)
BILIRUB SERPL-MCNC: 0.5 MG/DL (ref 0.2–1.3)
BUN SERPL-MCNC: 25 MG/DL (ref 7–30)
CALCIUM SERPL-MCNC: 9.2 MG/DL (ref 8.5–10.1)
CHLORIDE SERPL-SCNC: 104 MMOL/L (ref 94–109)
CHOLEST SERPL-MCNC: 219 MG/DL
CO2 SERPL-SCNC: 28 MMOL/L (ref 20–32)
CREAT SERPL-MCNC: 1.33 MG/DL (ref 0.66–1.25)
ERYTHROCYTE [DISTWIDTH] IN BLOOD BY AUTOMATED COUNT: 13 % (ref 10–15)
GFR SERPL CREATININE-BSD FRML MDRD: 54 ML/MIN/1.7M2
GLUCOSE SERPL-MCNC: 85 MG/DL (ref 70–99)
HCT VFR BLD AUTO: 45.7 % (ref 40–53)
HDLC SERPL-MCNC: 43 MG/DL
HGB BLD-MCNC: 16.1 G/DL (ref 13.3–17.7)
LDLC SERPL CALC-MCNC: 115 MG/DL
MCH RBC QN AUTO: 33.9 PG (ref 26.5–33)
MCHC RBC AUTO-ENTMCNC: 35.2 G/DL (ref 31.5–36.5)
MCV RBC AUTO: 96 FL (ref 78–100)
NONHDLC SERPL-MCNC: 176 MG/DL
PLATELET # BLD AUTO: 218 10E9/L (ref 150–450)
POTASSIUM SERPL-SCNC: 3.4 MMOL/L (ref 3.4–5.3)
PROT SERPL-MCNC: 7.4 G/DL (ref 6.8–8.8)
RBC # BLD AUTO: 4.75 10E12/L (ref 4.4–5.9)
SODIUM SERPL-SCNC: 140 MMOL/L (ref 133–144)
TRIGL SERPL-MCNC: 304 MG/DL
WBC # BLD AUTO: 8.3 10E9/L (ref 4–11)

## 2017-09-11 PROCEDURE — 36415 COLL VENOUS BLD VENIPUNCTURE: CPT | Performed by: FAMILY MEDICINE

## 2017-09-11 PROCEDURE — 80053 COMPREHEN METABOLIC PANEL: CPT | Performed by: FAMILY MEDICINE

## 2017-09-11 PROCEDURE — 85027 COMPLETE CBC AUTOMATED: CPT | Performed by: FAMILY MEDICINE

## 2017-09-11 PROCEDURE — 80061 LIPID PANEL: CPT | Performed by: FAMILY MEDICINE

## 2017-09-11 PROCEDURE — 86803 HEPATITIS C AB TEST: CPT | Performed by: FAMILY MEDICINE

## 2017-09-11 PROCEDURE — 99214 OFFICE O/P EST MOD 30 MIN: CPT | Performed by: FAMILY MEDICINE

## 2017-09-11 RX ORDER — HYDROCHLOROTHIAZIDE 25 MG/1
25 TABLET ORAL DAILY
Qty: 90 TABLET | Refills: 3 | Status: SHIPPED | OUTPATIENT
Start: 2017-09-11 | End: 2018-08-30

## 2017-09-11 NOTE — NURSING NOTE
"Chief Complaint   Patient presents with     Hypertension       Initial /82  Pulse 69  Temp 97.5  F (36.4  C) (Oral)  Wt 201 lb (91.2 kg)  SpO2 94%  BMI 33.45 kg/m2 Estimated body mass index is 33.45 kg/(m^2) as calculated from the following:    Height as of 2/2/17: 5' 5\" (1.651 m).    Weight as of this encounter: 201 lb (91.2 kg).  Medication Reconciliation: complete   Deb Clark MA      "

## 2017-09-11 NOTE — MR AVS SNAPSHOT
"              After Visit Summary   2017    Sabino Espinal    MRN: [id number]           Patient Information     Date Of Birth          1955        Visit Information        Provider Department      2017 2:40 PM Cm Salmon MD [facility] [place][note 2]        Today's Diagnoses     Hemochromatosis, unspecified hemochromatosis type    -  1    Hypertension goal BP (blood pressure) < 140/90        Tension headache        Need for hepatitis C screening test        Hyperlipidemia LDL goal <130           Follow-ups after your visit        Who to contact     If you have questions or need follow up information about today's clinic visit or your schedule please contact [facility] directly at [phone].  Normal or non-critical lab and imaging results will be communicated to you by MyChart, letter or phone within 4 business days after the clinic has received the results. If you do not hear from us within 7 days, please contact the clinic through MyChart or phone. If you have a critical or abnormal lab result, we will notify you by phone as soon as possible.  Submit refill requests through Zzish or call your pharmacy and they will forward the refill request to us. Please allow 3 business days for your refill to be completed.          Additional Information About Your Visit        MyChart Information     Zzish lets you send messages to your doctor, view your test results, renew your prescriptions, schedule appointments and more. To sign up, go to [email or web address] . Click on \"Log in\" on the left side of the screen, which will take you to the Welcome page. Then click on \"Sign up Now\" on the right side of the page.     You will be asked to enter the access code listed below, as well as some personal information. Please follow the directions to create your username and password.     Your access code is: [email or web address]  Expires: 2017  1:09 PM     Your access code will  " in 90 days. If you need help or a new code, please call your [place] clinic or [phone].        Care EveryWhere ID     This is your Care EveryWhere ID. This could be used by other organizations to access your [place] medical records  [id number]        Your Vitals Were     Pulse Temperature Pulse Oximetry BMI (Body Mass Index)          69 97.5  F (36.4  C) (Oral) 94% 33.45 kg/m2         Blood Pressure from Last 3 Encounters:   09/11/17 124/82   02/02/17 (!) 142/98   01/10/17 122/82    Weight from Last 3 Encounters:   09/11/17 201 lb (91.2 kg)   02/02/17 205 lb 12.8 oz (93.4 kg)   01/10/17 207 lb (93.9 kg)              We Performed the Following     CBC with platelets     Comprehensive metabolic panel     Hepatitis C Screen Reflex to HCV RNA Quant and Genotype     Lipid panel reflex to direct LDL          Today's Medication Changes          These changes are accurate as of: 9/11/17 11:59 PM.  If you have any questions, ask your nurse or doctor.               These medicines have changed or have updated prescriptions.        Dose/Directions    hydrochlorothiazide 25 MG tablet   Commonly known as:  HYDRODIURIL   This may have changed:  when to take this   Used for:  Hypertension goal BP (blood pressure) < 140/90   Changed by:  Cm Salmon MD        Dose:  25 mg   Take 1 tablet (25 mg) by mouth daily   Quantity:  90 tablet   Refills:  3            Where to get your medicines      These medications were sent to [facility] [place] [address]  [facility] [address] [place] [id number]     Phone:  [phone]     hydrochlorothiazide 25 MG tablet                Primary Care Provider Office Phone # Fax #    Cm Salmon [phone]       [address] [place] [address]        Equal Access to Services     ADAN MARTINEZ AH: Rashmi Doty, waaxda luqadaha, qaybta kaalmabayron packer, chalres gibson. So  [facility] [phone].    ATENCIÓN: Si habla scott, tiene a chase disposición servicios gratuitos de asistencia lingüística. Belinda conrejo [phone].    We comply with applicable federal civil rights laws and [place] laws. We do not discriminate on the basis of race, color, national origin, age, disability sex, sexual orientation or gender identity.            Thank you!     Thank you for choosing [facility]  for your care. Our goal is always to provide you with excellent care. Hearing back from our patients is one way we can continue to improve our services. Please take a few minutes to complete the written survey that you may receive in the mail after your visit with us. Thank you!             Your Updated Medication List - Protect others around you: Learn how to safely use, store and throw away your medicines at www.disposemymeds.org.          This list is accurate as of: 9/11/17 11:59 PM.  Always use your most recent med list.                   Brand Name Dispense Instructions for use Diagnosis    aspirin 81 MG tablet      Take 81 mg by mouth daily        hydrochlorothiazide 25 MG tablet    HYDRODIURIL    90 tablet    Take 1 tablet (25 mg) by mouth daily    Hypertension goal BP (blood pressure) < 140/90       TOPIRAMATE PO      Take 50 mg by mouth 2 times daily

## 2017-09-11 NOTE — PROGRESS NOTES
SUBJECTIVE:   Sabino Espinal is a 62 year old male who presents to clinic today for the following health issues:      Hypertension Follow-up      Outpatient blood pressures are not being checked.    Low Salt Diet: not monitoring salt        Amount of exercise or physical activity: None    Problems taking medications regularly: No    Medication side effects: none  Diet: regular (no restrictions)    Dizziness - not from ears but from nerves from neck. Been to orthopedic. Had radiofrequency ablation. Its working well.   Still taking topamax for headache. Was getting it from [facility].     Hemochromatosis - would like to be checked for hemoglobin today. No symptoms.     Problem list and histories reviewed & adjusted, as indicated.  Additional history: as documented    Labs reviewed in EPIC    Reviewed and updated as needed this visit by clinical staff     Reviewed and updated as needed this visit by Provider      Social History     Social History     Marital status:      Spouse name: Jacque     Number of children: 3     Years of education: 12     Occupational History     [employer] Retired     [employer]           Social History Main Topics     Smoking status: Former Smoker     Packs/day: 1.00     Years: 10.00     Types: Cigarettes     Smokeless tobacco: Never Used      Comment: quit at age 25     Alcohol use Yes      Comment: rarely      Drug use: No     Sexual activity: Yes     Partners: Female     Other Topics Concern     Parent/Sibling W/ Cabg, Mi Or Angioplasty Before 65f 55m? No     Caffeine Concern Not Asked     No caffeine     Exercise Not Asked     Walking 2 times a week. Hurts the knee to do more     Social History Narrative    Balanced Diet - Yes    Osteoporosis Preventative measures-  Dairy servings per day: 2-3    Regular Exercise -  No Describe walks a lot on the job    Dental Exam up - YES - Date: 6 months ago    Eye Exam - YES - Date: 1 yr ago     Self Testicular Exam -  No    Do you have any concerns about STD's -  No    Abuse: Current or Past (Physical, Sexual or Emotional)- No    Do you feel safe in your environment - Yes    Guns stored in the home - na    Sunscreen used - sometimes    Seatbelts used - Yes    Lipids - NO    Glucose -  NO    Colon Cancer Screening - No    Hemoccults - NO    PSA - NO    Digital Rectal Exam - yrs ago    Immunizations reviewed and up to date - ?    Marlene CARR             Allergies   Allergen Reactions     Oxycodone-Acetaminophen Rash     Patient Active Problem List   Diagnosis     Inguinal hernia     Generalized osteoarthrosis, unspecified site     Esophageal reflux     Hemochromatosis     HYPERLIPIDEMIA LDL GOAL <130     Hypertension goal BP (blood pressure) < 140/90     Tinnitus, bilateral     DJD (degenerative joint disease) of knee     BCC (basal cell carcinoma), face     Cervicalgia     Tension headache     Reviewed medications, social history and  past medical and surgical history.    Review of system: for general, respiratory, CVS, GI and psychiatry negative except for noted above.     EXAM:  /82  Pulse 69  Temp 97.5  F (36.4  C) (Oral)  Wt 201 lb (91.2 kg)  SpO2 94%  BMI 33.45 kg/m2  Constitutional: healthy, alert and no distress   Psychiatric: mentation appears normal and affect normal/bright  Cardiovascular: RRR. No murmurs,  Respiratory: negative, Lungs clear. No crackles or wheezing. No tachypnea.   Head: Normocephalic. No masses, lesions, tenderness or abnormalities     ASSESSMENT / PLAN:  ((E83.119) Hemochromatosis, unspecified hemochromatosis type  (primary encounter diagnosis)  Comment: check hemoglobin today.   Plan: CBC with platelets             (I10) Hypertension goal BP (blood pressure) < 140/90  Comment:  Patient is tolerating current medication without any major side effects of concerns and current dose seems reasonable too.  Current medication regime is effective. Continue current  treatment without any changes.   Plan: hydrochlorothiazide (HYDRODIURIL) 25 MG tablet,        Comprehensive metabolic panel             (G44.209) Tension headache  Comment: had RF ablation of cervical spine region. Was seeing neuro but not anymore and getting topamax for headache.  Plan: I will take over after he runs out of current rx if he needs to continue those. He has around 6-9 months of rx for now. He can call for refill.     (Z11.59) Need for hepatitis C screening test  Comment:    Plan: Hepatitis C Screen Reflex to HCV RNA Quant and         Genotype             (E78.5) Hyperlipidemia LDL goal <130  Comment:     Plan: Lipid panel reflex to direct LDL             Follow up yearly.

## 2017-09-11 NOTE — LETTER
September 13, 2017      Sabino Espinal  [address]  [place] [address]        Dear ,    We are writing to inform you of your test results.    Hemoglobin is fine (for hemochromatosis).  Kidney function is slightly worse than last time. Drink plenty of fluids. We should recheck it next year.  Liver function is fine.  Hepatitis C screen test is fine.   Cholesterol is better than last time. Bad cholesterol (LDL) improved.  The triglycerides are high. Lowering  the amount of sugar ,alcohol and sweets in the diet helps to control this.Exercise and weight loss helps.  They are not high enough to consider therapy with medicine.    Resulted Orders   Comprehensive metabolic panel   Result Value Ref Range    Sodium 140 133 - 144 mmol/L    Potassium 3.4 3.4 - 5.3 mmol/L    Chloride 104 94 - 109 mmol/L    Carbon Dioxide 28 20 - 32 mmol/L    Anion Gap 8 3 - 14 mmol/L    Glucose 85 70 - 99 mg/dL      Comment:      Non Fasting    Urea Nitrogen 25 7 - 30 mg/dL    Creatinine 1.33 (H) 0.66 - 1.25 mg/dL    GFR Estimate 54 (L) >60 mL/min/1.7m2      Comment:      Non  GFR Calc    GFR Estimate If Black 65 >60 mL/min/1.7m2      Comment:       GFR Calc    Calcium 9.2 8.5 - 10.1 mg/dL    Bilirubin Total 0.5 0.2 - 1.3 mg/dL    Albumin 3.8 3.4 - 5.0 g/dL    Protein Total 7.4 6.8 - 8.8 g/dL    Alkaline Phosphatase 58 40 - 150 U/L    ALT 25 0 - 70 U/L    AST 18 0 - 45 U/L   Lipid panel reflex to direct LDL   Result Value Ref Range    Cholesterol 219 (H) <200 mg/dL      Comment:      Desirable:       <200 mg/dl    Triglycerides 304 (H) <150 mg/dL      Comment:      Borderline high:  150-199 mg/dl  High:             200-499 mg/dl  Very high:       >499 mg/dl  Non Fasting      HDL Cholesterol 43 >39 mg/dL    LDL Cholesterol Calculated 115 (H) <100 mg/dL      Comment:      Above desirable:  100-129 mg/dl  Borderline High:  130-159 mg/dL  High:             160-189 mg/dL  Very high:       >189  mg/dl      Non HDL Cholesterol 176 (H) <130 mg/dL      Comment:      Above Desirable:  130-159 mg/dl  Borderline high:  160-189 mg/dl  High:             190-219 mg/dl  Very high:       >219 mg/dl     CBC with platelets   Result Value Ref Range    WBC 8.3 4.0 - 11.0 10e9/L    RBC Count 4.75 4.4 - 5.9 10e12/L    Hemoglobin 16.1 13.3 - 17.7 g/dL    Hematocrit 45.7 40.0 - 53.0 %    MCV 96 78 - 100 fl    MCH 33.9 (H) 26.5 - 33.0 pg    MCHC 35.2 31.5 - 36.5 g/dL    RDW 13.0 10.0 - 15.0 %    Platelet Count 218 150 - 450 10e9/L   Hepatitis C Screen Reflex to HCV RNA Quant and Genotype   Result Value Ref Range    Hepatitis C Antibody Nonreactive NR^Nonreactive      Comment:      Assay performance characteristics have not been established for newborns,   infants, and children     If you have any questions or concerns, please call the clinic at the number listed above.   Sincerely,  Cm Salmon MD/nr

## 2017-09-12 LAB — HCV AB SERPL QL IA: NONREACTIVE

## 2018-07-09 ENCOUNTER — TELEPHONE (OUTPATIENT)
Dept: FAMILY MEDICINE | Facility: CLINIC | Age: 63
End: 2018-07-09

## 2018-07-09 ENCOUNTER — HOSPITAL ENCOUNTER (OUTPATIENT)
Facility: CLINIC | Age: 63
End: 2018-07-09
Attending: COLON & RECTAL SURGERY | Admitting: COLON & RECTAL SURGERY
Payer: COMMERCIAL

## 2018-08-30 ENCOUNTER — TELEPHONE (OUTPATIENT)
Dept: FAMILY MEDICINE | Facility: CLINIC | Age: 63
End: 2018-08-30

## 2018-08-30 DIAGNOSIS — I10 HYPERTENSION GOAL BP (BLOOD PRESSURE) < 140/90: ICD-10-CM

## 2018-08-30 RX ORDER — HYDROCHLOROTHIAZIDE 25 MG/1
25 TABLET ORAL DAILY
Qty: 90 TABLET | Refills: 0 | Status: SHIPPED | OUTPATIENT
Start: 2018-08-30 | End: 2018-10-16

## 2018-08-30 NOTE — TELEPHONE ENCOUNTER
Reason for Call:  Medication or medication refill:    Do you use a [facility]?  Name of the pharmacy and phone number for the current request:  [facility] [place] - [phone]    Name of the medication requested: hydrochlorothiazide (HYDRODIURIL) 25 MG tablet    Other request: Pt is requesting for a refill. Please advise, thank you!  (FYI- pt states September is a busy month for him, [place] & is unable to come in for a OV)      Can we leave a detailed message on this number? YES    Phone number patient can be reached at: Cell number on file:    Telephone Information:   Mobile [phone]       Best Time: anytime    Call taken on 8/30/2018 at 9:20 AM by Ananya Gil

## 2018-08-30 NOTE — TELEPHONE ENCOUNTER
Creatinine   Date Value Ref Range Status   09/11/2017 1.33 (H) 0.66 - 1.25 mg/dL Final     Potassium   Date Value Ref Range Status   09/11/2017 3.4 3.4 - 5.3 mmol/L Final     BP Readings from Last 3 Encounters:   09/11/17 124/82   02/02/17 (!) 142/98   01/10/17 122/82      Dr Salmon,    Are you ok to give more than 30 days. See TC comments. Last OV was 9/11/17.    Janett Aviles, RN, BSN             Let pt know it was refilled for 30 days but he needs yearly exam.

## 2018-09-21 RX ORDER — ONDANSETRON 2 MG/ML
4 INJECTION INTRAMUSCULAR; INTRAVENOUS
Status: CANCELLED | OUTPATIENT
Start: 2018-09-21

## 2018-09-21 RX ORDER — LIDOCAINE 40 MG/G
CREAM TOPICAL
Status: CANCELLED | OUTPATIENT
Start: 2018-09-21

## 2018-10-16 ENCOUNTER — OFFICE VISIT (OUTPATIENT)
Dept: FAMILY MEDICINE | Facility: CLINIC | Age: 63
End: 2018-10-16
Payer: COMMERCIAL

## 2018-10-16 VITALS
DIASTOLIC BLOOD PRESSURE: 87 MMHG | TEMPERATURE: 98.2 F | BODY MASS INDEX: 34.36 KG/M2 | RESPIRATION RATE: 16 BRPM | HEART RATE: 68 BPM | WEIGHT: 206.25 LBS | HEIGHT: 65 IN | SYSTOLIC BLOOD PRESSURE: 135 MMHG | OXYGEN SATURATION: 96 %

## 2018-10-16 DIAGNOSIS — Z23 NEED FOR PROPHYLACTIC VACCINATION WITH TETANUS-DIPHTHERIA (TD): ICD-10-CM

## 2018-10-16 DIAGNOSIS — Z23 NEED FOR PROPHYLACTIC VACCINATION AGAINST STREPTOCOCCUS PNEUMONIAE (PNEUMOCOCCUS): ICD-10-CM

## 2018-10-16 DIAGNOSIS — I10 HYPERTENSION GOAL BP (BLOOD PRESSURE) < 140/90: ICD-10-CM

## 2018-10-16 DIAGNOSIS — Z85.828 HISTORY OF BASAL CELL CARCINOMA: ICD-10-CM

## 2018-10-16 DIAGNOSIS — Z87.891 FORMER SMOKER: ICD-10-CM

## 2018-10-16 DIAGNOSIS — Z12.11 SCREEN FOR COLON CANCER: ICD-10-CM

## 2018-10-16 DIAGNOSIS — Z00.00 WELCOME TO MEDICARE PREVENTIVE VISIT: Primary | ICD-10-CM

## 2018-10-16 DIAGNOSIS — Z11.4 SCREENING FOR HIV (HUMAN IMMUNODEFICIENCY VIRUS): ICD-10-CM

## 2018-10-16 DIAGNOSIS — Z12.5 SCREENING PSA (PROSTATE SPECIFIC ANTIGEN): ICD-10-CM

## 2018-10-16 DIAGNOSIS — E78.5 HYPERLIPIDEMIA LDL GOAL <130: ICD-10-CM

## 2018-10-16 PROCEDURE — 80061 LIPID PANEL: CPT | Performed by: FAMILY MEDICINE

## 2018-10-16 PROCEDURE — 90715 TDAP VACCINE 7 YRS/> IM: CPT | Performed by: FAMILY MEDICINE

## 2018-10-16 PROCEDURE — 80053 COMPREHEN METABOLIC PANEL: CPT | Performed by: FAMILY MEDICINE

## 2018-10-16 PROCEDURE — 36415 COLL VENOUS BLD VENIPUNCTURE: CPT | Performed by: FAMILY MEDICINE

## 2018-10-16 PROCEDURE — 99397 PER PM REEVAL EST PAT 65+ YR: CPT | Mod: 25 | Performed by: FAMILY MEDICINE

## 2018-10-16 PROCEDURE — 87389 HIV-1 AG W/HIV-1&-2 AB AG IA: CPT | Performed by: FAMILY MEDICINE

## 2018-10-16 PROCEDURE — 90670 PCV13 VACCINE IM: CPT | Performed by: FAMILY MEDICINE

## 2018-10-16 PROCEDURE — 90471 IMMUNIZATION ADMIN: CPT | Performed by: FAMILY MEDICINE

## 2018-10-16 PROCEDURE — 90472 IMMUNIZATION ADMIN EACH ADD: CPT | Performed by: FAMILY MEDICINE

## 2018-10-16 PROCEDURE — G0103 PSA SCREENING: HCPCS | Performed by: FAMILY MEDICINE

## 2018-10-16 RX ORDER — HYDROCHLOROTHIAZIDE 25 MG/1
25 TABLET ORAL DAILY
Qty: 90 TABLET | Refills: 3 | Status: SHIPPED | OUTPATIENT
Start: 2018-10-16

## 2018-10-16 ASSESSMENT — ENCOUNTER SYMPTOMS
CONSTIPATION: 0
MYALGIAS: 0
ABDOMINAL PAIN: 0
NERVOUS/ANXIOUS: 0
SHORTNESS OF BREATH: 0
DIZZINESS: 1
JOINT SWELLING: 0
HEARTBURN: 0
NAUSEA: 0
EYE PAIN: 0
HEMATOCHEZIA: 0
PALPITATIONS: 0
HEMATURIA: 0
DIARRHEA: 0
DYSURIA: 0
ARTHRALGIAS: 1
HEADACHES: 1
SORE THROAT: 0
WEAKNESS: 0
PARESTHESIAS: 0
FEVER: 0
COUGH: 0
FREQUENCY: 1
CHILLS: 0

## 2018-10-16 ASSESSMENT — ACTIVITIES OF DAILY LIVING (ADL)
CURRENT_FUNCTION: NO ASSISTANCE NEEDED
I_NEED_ASSISTANCE_FOR_THE_FOLLOWING_DAILY_ACTIVITIES:: NO ASSISTANCE IS NEEDED

## 2018-10-16 NOTE — MR AVS SNAPSHOT
After Visit Summary   10/16/2018    Sabino Espinal    MRN: [id number]           Patient Information     Date Of Birth          1955        Visit Information        Provider Department      10/16/2018 2:40 PM Cm Salmon MD [facility]        Today's Diagnoses     Hyperlipidemia LDL goal <130    -  1    Screen for colon cancer        Screening for HIV (human immunodeficiency virus)        Need for prophylactic vaccination against Streptococcus pneumoniae (pneumococcus)        Need for prophylactic vaccination with tetanus-diphtheria (Td)        Hypertension goal BP (blood pressure) < 140/90        Former smoker        Welcome to Medicare preventive visit        History of basal cell carcinoma          Care Instructions      Preventive Health Recommendations:       Male Ages 65 and over    Yearly exam:             See your health care provider every year in order to  o   Review health changes.   o   Discuss preventive care.    o   Review your medicines if your doctor has prescribed any.    Talk with your health care provider about whether you should have a test to screen for prostate cancer (PSA).    Every 3 years, have a diabetes test (fasting glucose). If you are at risk for diabetes, you should have this test more often.    Every 5 years, have a cholesterol test. Have this test more often if you are at risk for high cholesterol or heart disease.     Every 10 years, have a colonoscopy. Or, have a yearly FIT test (stool test). These exams will check for colon cancer.    Talk to with your health care provider about screening for Abdominal Aortic Aneurysm if you have a family history of AAA or have a history of smoking.  Shots:     Get a flu shot each year.     Get a tetanus shot every 10 years.     Talk to your doctor about your pneumonia vaccines. There are now two you should receive - Pneumovax (PPSV 23) and Prevnar (PCV 13).    Talk to your pharmacist about a shingles  vaccine.     Talk to your doctor about the hepatitis B vaccine.  Nutrition:     Eat at least 5 servings of fruits and vegetables each day.     Eat whole-grain bread, whole-wheat pasta and brown rice instead of white grains and rice.     Get adequate Calcium and Vitamin D.   Lifestyle    Exercise for at least 150 minutes a week (30 minutes a day, 5 days a week). This will help you control your weight and prevent disease.     Limit alcohol to one drink per day.     No smoking.     Wear sunscreen to prevent skin cancer.     See your dentist every six months for an exam and cleaning.     See your eye doctor every 1 to 2 years to screen for conditions such as glaucoma, macular degeneration and cataracts.          Follow-ups after your visit        Additional Services     DERMATOLOGY REFERRAL       Your provider has referred you to: FMG: [facility] [phone]   [email or web address]  FMG: [facility]  [place] [phone]   [email or web address]  UMP: D[facility]atology Clinic [facility] [phone]   [email or web address]://[email or web address]  UM: [facility] [phone]   [email or web address]://[email or web address]/Clinics/[email or web address]-sqitzhi-[email or web address]/  FHN: [facility] D[facility]atology [facility] [phone]   [email or web address]://[email or web address]  FHN: [facility] D[facility]at[facility] [phone]   [email or web address]  FHN: [facility] [place] [phone]   [email or web address]  FHN: [facility] [phone]   [email or web address]  FHN: [facility] [phone]   [email or web address]  FHN: [facility] [phone]   [email or web address]  FHN: [facility] [place] [phone]    [email or web address]  [facility] [place] [facility] [place] [phone]   [email or web address]  [facility] [place] [phone]   [email or web address]  [place] [facility] [phone]   [email or web address]  [facility] [place] [phone]   [email or web address]  [facility] [phone]   [email or web address]  [facility] [place] [phone]   [email or web address]    Please be aware that coverage of these services is subject to the terms and limitations of your health insurance plan.  Call member services at your health plan with any benefit or coverage questions.      Please bring the following with you to your appointment:    (1) Any X-Rays, CTs or MRIs which have been performed.  Contact the facility where they were done to arrange for  prior to your scheduled appointment.  Any new CT, MRI or other procedures ordered by your specialist must be performed at a [facility] facility or coordinated by your clinic's referral office.  (2) List of current medications  (3) This referral request   (4) Any documents/labs given to you for this referral            GASTROENTEROLOGY ADULT REF PROCEDURE ONLY       Last Lab Result: Creatinine (mg/dL)       Date                     Value                 09/11/2017               1.33 (H)         ----------  There is no height or weight on file to calculate BMI.     Needed:  No  Language:  English    Patient will be contacted to schedule procedure.     Please be aware that coverage of these services is subject to the terms and limitations of your health insurance plan.  Call member services at your health plan with any benefit or coverage questions.  Any procedures must be performed at a [facility] facility OR coordinated by your clinic's referral office.    Please  "bring the following with you to your appointment:    (1) Any X-Rays, CTs or MRIs which have been performed.  Contact the facility where they were done to arrange for  prior to your scheduled appointment.    (2) List of current medications   (3) This referral request   (4) Any documents/labs given to you for this referral                  Your next 10 appointments already scheduled     Jan 04, 2019   Procedure with Glenna Giles MD   [facility] ([facility])    [address]  [place] [address] [phone]           [facility] is located at [address] [place] [place]              Who to contact     If you have questions or need follow up information about today's clinic visit or your schedule please contact [facility] directly at [phone].  Normal or non-critical lab and imaging results will be communicated to you by MyChart, letter or phone within 4 business days after the clinic has received the results. If you do not hear from us within 7 days, please contact the clinic through MyChart or phone. If you have a critical or abnormal lab result, we will notify you by phone as soon as possible.  Submit refill requests through [employer] or call your pharmacy and they will forward the refill request to us. Please allow 3 business days for your refill to be completed.          Additional Information About Your Visit        Care EveryWhere ID     This is your Care EveryWhere ID. This could be used by other organizations to access your [place] medical records  [id number]        Your Vitals Were     Pulse Temperature Respirations Height Pulse Oximetry BMI (Body Mass Index)    68 98.2  F (36.8  C) (Oral) 16 5' 5.25\" (1.657 m) 96% 34.06 kg/m2       Blood Pressure from Last 3 Encounters:   10/16/18 135/87   09/11/17 124/82   02/02/17 (!) 142/98    Weight from Last 3 Encounters:   10/16/18 206 lb 4 oz (93.6 kg)   09/11/17 201 lb (91.2 kg) "   02/02/17 205 lb 12.8 oz (93.4 kg)              We Performed the Following          ADMIN VACCINE, ADDL [26277]          ADMIN VACCINE, FIRST     COMPREHENSIVE METABOLIC PANEL     DERMATOLOGY REFERRAL     GASTROENTEROLOGY ADULT REF PROCEDURE ONLY     HIV Screening     Lipid panel reflex to direct LDL Fasting     Pneumococcal vaccine 13 valent PCV13 IM (Prevnar) [81868]     TDAP VACCINE (ADACEL)          Where to get your medicines      These medications were sent to [facility] [place] [id number]  [address], [facility] [address]     Phone:  [phone]     hydrochlorothiazide 25 MG tablet          Primary Care Provider Office Phone # Fax #    Cm Zaria Salmon [phone]       [address] [facility] [address]        Equal Access to Services     ADAN MARTINEZ : Hadii aad ku hadasho Somarcela, waaxda luqadaha, qaybta kaalmada adeegyada, charles worthington . So [facility] [phone].    ATENCIÓN: Si habla español, tiene a chase disposición servicios gratuitos de asistencia lingüística. Llame al [phone].    We comply with applicable federal civil rights laws and [place] laws. We do not discriminate on the basis of race, color, national origin, age, disability, sex, sexual orientation, or gender identity.            Thank you!     Thank you for choosing [facility]  for your care. Our goal is always to provide you with excellent care. Hearing back from our patients is one way we can continue to improve our services. Please take a few minutes to complete the written survey that you may receive in the mail after your visit with us. Thank you!             Your Updated Medication List - Protect others around you: Learn how to safely use, store and throw away your medicines at www.disposemymeds.org.          This list is accurate as of 10/16/18  3:18 PM.  Always use your most recent med list.                   Brand Name  Dispense Instructions for use Diagnosis    aspirin 81 MG tablet      Take 81 mg by mouth daily        hydrochlorothiazide 25 MG tablet    HYDRODIURIL    90 tablet    Take 1 tablet (25 mg) by mouth daily    Hypertension goal BP (blood pressure) < 140/90       topiramate 50 MG tablet    TOPAMAX    180 tablet    Take 1 tablet (50 mg) by mouth 2 times daily    Tension headache

## 2018-10-16 NOTE — NURSING NOTE
Screening Questionnaire for Adult Immunization    Are you sick today?   No   Do you have allergies to medications, food, a vaccine component or latex?   No   Have you ever had a serious reaction after receiving a vaccination?   No   Do you have a long-term health problem with heart disease, lung disease, asthma, kidney disease, metabolic disease (e.g. diabetes), anemia, or other blood disorder?   No   Do you have cancer, leukemia, HIV/AIDS, or any other immune system problem?   No   In the past 3 months, have you taken medications that affect  your immune system, such as prednisone, other steroids, or anticancer drugs; drugs for the treatment of rheumatoid arthritis, Crohn s disease, or psoriasis; or have you had radiation treatments?   No   Have you had a seizure, or a brain or other nervous system problem?   No   During the past year, have you received a transfusion of blood or blood     products, or been given immune (gamma) globulin or antiviral drug?   No   For women: Are you pregnant or is there a chance you could become        pregnant during the next month?   No   Have you received any vaccinations in the past 4 weeks?   No     Immunization questionnaire answers were all negative.        Per orders of Dr. Salmon, injection of Tdap (adacel) and pcv 13 given by Lisa Auguste. Patient instructed to remain in clinic for 15 minutes afterwards, and to report any adverse reaction to me immediately.    Due to injection administration, patient instructed to remain in clinic for 15 minutes  afterwards, and to report any adverse reaction to me immediately.       Screening performed by Lisa Auguste on 10/16/2018 at 3:34 PM.

## 2018-10-16 NOTE — PROGRESS NOTES
SUBJECTIVE:   Sabino Espinal is a 63 year old male who presents for Preventive Visit.    Are you in the first 12 months of your Medicare coverage?  Yes,  Visual Acuity:  Right Eye: 20/20   Left Eye: 20/16  Both Eyes: 20/16    Physical   Annual:     Getting at least 3 servings of Calcium per day:  NO    Bi-annual eye exam:  NO    Dental care twice a year:  Yes    Sleep apnea or symptoms of sleep apnea:  Daytime drowsiness    Diet:  Regular (no restrictions)    Frequency of exercise:  None    Taking medications regularly:  Yes    Medication side effects:  None    Additional concerns today:  No    Ability to successfully perform activities of daily living: no assistance needed    Home Safety:  No safety concerns identified    Hearing Impairment: difficulty following a conversation in a noisy restaurant or crowded room and difficulty following dialogue in the theater      Fall risk:  Fallen 2 or more times in the past year?: No  Any fall with injury in the past year?: No    COGNITIVE SCREEN  1) Repeat 3 items (Leader, Season, Table)    2) Clock draw: NORMAL  3) 3 item recall: Recalls 1 object   Results: NORMAL clock, 1-2 items recalled: COGNITIVE IMPAIRMENT LESS LIKELY    Mini-CogTM Copyright BENNY Rivas. Licensed by the author for use in [facility]; reprinted with permission (soob@[facility]). All rights reserved.        Reviewed and updated as needed this visit by clinical staff  Tobacco  Allergies  Meds  Med Hx  Surg Hx  Fam Hx  Soc Hx      Reviewed and updated as needed this visit by Provider    Social History   Substance Use Topics     Smoking status: Former Smoker     Packs/day: 1.00     Years: 10.00     Types: Cigarettes     Smokeless tobacco: Never Used      Comment: quit at age 25     Alcohol use Yes      Comment: rarely      Alcohol Use 10/16/2018   If you drink alcohol do you typically have greater than 3 drinks per day OR greater than 7 drinks per week? No   No flowsheet data  found.    Hypertension Follow-up    Outpatient blood pressures are not being checked.    Low Salt Diet: no added salt    Today's PHQ-2 Score:   PHQ-2 ( 1999 Pfizer) 10/16/2018   Q1: Little interest or pleasure in doing things 0   Q2: Feeling down, depressed or hopeless 0   PHQ-2 Score 0   Q1: Little interest or pleasure in doing things Not at all   Q2: Feeling down, depressed or hopeless Not at all   PHQ-2 Score 0     Do you feel safe in your environment - Yes    Do you have a Health Care Directive?: No: Advance care planning reviewed with patient; information given to patient to review.    Current providers sharing in care for this patient include:   Patient Care Team:  Cm Salmon MD as PCP - General (Family Practice)  Cm Salmon MD as MD (Family Practice)    The following health maintenance items are reviewed in Epic and correct as of today:  Health Maintenance   Topic Date Due     ADVANCE DIRECTIVE PLANNING Q5 YRS  12/01/2016     COLON CANCER SCREEN (SYSTEM ASSIGNED)  02/14/2018     FALL RISK ASSESSMENT  04/30/2018     AORTIC ANEURYSM SCREENING (SYSTEM ASSIGNED)  04/30/2018     INFLUENZA VACCINE (1) 09/01/2018     CBC Q1 YR  09/11/2018     CMP Q1 YR  10/16/2019     LIPID MONITORING Q1 YEAR  10/16/2019     PNEUMOCOCCAL (2 of 2 - PPSV23) 10/16/2019     PHQ-2 Q1 YR  10/16/2019     TETANUS IMMUNIZATION (SYSTEM ASSIGNED)  10/16/2028     HIV SCREEN (SYSTEM ASSIGNED)  Completed     HEPATITIS C SCREENING  Completed     Vertigo - wondering about lyme test.   Had nerve ablation and it worked but now symptoms are coming back.   Some frequency of urination.   Left ear hearing loss - chronic.     Review of Systems   Constitutional: Negative for chills and fever.   HENT: Positive for hearing loss. Negative for congestion, ear pain and sore throat.    Eyes: Negative for pain and visual disturbance.   Respiratory: Negative for cough and shortness of breath.    Cardiovascular: Negative for chest  "pain, palpitations and peripheral edema.   Gastrointestinal: Negative for abdominal pain, constipation, diarrhea, heartburn, hematochezia and nausea.   Genitourinary: Positive for frequency and impotence. Negative for discharge, dysuria, genital sores, hematuria and urgency.   Musculoskeletal: Positive for arthralgias. Negative for joint swelling and myalgias.   Skin: Negative for rash.   Neurological: Positive for dizziness and headaches. Negative for weakness and paresthesias.   Psychiatric/Behavioral: Negative for mood changes. The patient is not nervous/anxious.      OBJECTIVE:   /87 (BP Location: Right arm, Patient Position: Sitting, Cuff Size: Adult Large)  Pulse 68  Temp 98.2  F (36.8  C) (Oral)  Resp 16  Ht 5' 5.25\" (1.657 m)  Wt 206 lb 4 oz (93.6 kg)  SpO2 96%  BMI 34.06 kg/m2 Estimated body mass index is 34.06 kg/(m^2) as calculated from the following:    Height as of this encounter: 5' 5.25\" (1.657 m).    Weight as of this encounter: 206 lb 4 oz (93.6 kg).  Physical Exam  GENERAL: healthy, alert and no distress  EYES: Eyes grossly normal to inspection, PERRL and conjunctivae and sclerae normal  HENT: ear canals and TM's normal, nose and mouth without ulcers or lesions  NECK: no adenopathy, no asymmetry, masses, or scars and thyroid normal to palpation  RESP: lungs clear to auscultation - no rales, rhonchi or wheezes  CV: regular rate and rhythm, normal S1 S2, no S3 or S4, no murmur, click or rub, no peripheral edema and peripheral pulses strong  ABDOMEN: soft, nontender, no hepatosplenomegaly, no masses and bowel sounds normal  MS: no gross musculoskeletal defects noted, no edema  SKIN: no suspicious lesions or rashes  NEURO: Normal strength and tone, mentation intact and speech normal  PSYCH: mentation appears normal, affect normal/bright    ASSESSMENT / PLAN:   1. Welcome to Medicare preventive visit       2. Screen for colon cancer     - GASTROENTEROLOGY ADULT REF PROCEDURE ONLY    3. " "Screening for HIV (human immunodeficiency virus)     - HIV Screening    4. Need for prophylactic vaccination against Streptococcus pneumoniae (pneumococcus)     - Pneumococcal vaccine 13 valent PCV13 IM (Prevnar) [04481]  -      ADMIN VACCINE, ADDL [42656]    5. Need for prophylactic vaccination with tetanus-diphtheria (Td)     - TDAP VACCINE (ADACEL)  -      ADMIN VACCINE, FIRST    6. Hypertension goal BP (blood pressure) < 140/90     - COMPREHENSIVE METABOLIC PANEL  - hydrochlorothiazide (HYDRODIURIL) 25 MG tablet; Take 1 tablet (25 mg) by mouth daily  Dispense: 90 tablet; Refill: 3    7. Hyperlipidemia LDL goal <130     - Lipid panel reflex to direct LDL Fasting  - COMPREHENSIVE METABOLIC PANEL    8. Former smoker  Declined AAA screen. Will think about it.     9. History of basal cell carcinoma     - DERMATOLOGY REFERRAL    10. Screening PSA (prostate specific antigen)     - PSA, screen    End of Life Planning:  Patient currently has an advanced directive: No.  I have verified the patient's ablity to prepare an advanced directive/make health care decisions.  Literature was provided to assist patient in preparing an advanced directive.    COUNSELING:  Reviewed preventive health counseling, as reflected in patient instructions  Special attention given to:       Regular exercise       Healthy diet/nutrition       Vision screening       Hearing screening       Dental care    BP Readings from Last 1 Encounters:   10/16/18 135/87     Estimated body mass index is 34.06 kg/(m^2) as calculated from the following:    Height as of this encounter: 5' 5.25\" (1.657 m).    Weight as of this encounter: 206 lb 4 oz (93.6 kg).      Weight management plan: Discussed healthy diet and exercise guidelines and patient will follow up in 12 months in clinic to re-evaluate.     reports that he has quit smoking. His smoking use included Cigarettes. He has a 10.00 pack-year smoking history. He has never used smokeless " tobacco.      Appropriate preventive services were discussed with this patient, including applicable screening as appropriate for cardiovascular disease, diabetes, osteopenia/osteoporosis, and glaucoma.  As appropriate for age/gender, discussed screening for colorectal cancer, prostate cancer, breast cancer, and cervical cancer. Checklist reviewing preventive services available has been given to the patient.    Reviewed patients plan of care and provided an AVS. The Basic Care Plan (routine screening as documented in Health Maintenance) for Sabino meets the Care Plan requirement. This Care Plan has been established and reviewed with the Patient.    Counseling Resources:  ATP IV Guidelines  Pooled Cohorts Equation Calculator  Breast Cancer Risk Calculator  FRAX Risk Assessment  ICSI Preventive Guidelines  Dietary Guidelines for Americans, 2010  USDA's MyPlate  ASA Prophylaxis  Lung CA Screening    Cm Salmon MD  [facility] for HPI/ROS submitted by the patient on 10/16/2018   PHQ-2 Score: 0

## 2018-10-16 NOTE — LETTER
October 18, 2018      Sabino Espnial  [address]  [facility] [place] [address]        Dear ,    We are writing to inform you of your test results.    Your cholesterol is getting worse.  Your risk of developing heart attack and stroke and next 10 years is about 15% which is quite high for your age.  I do recommend you to restart your cholesterol medication.  I know you have in the past use cholesterol medication but wishes not to use it anymore.  Please let me know and I can send a new prescription if you choose to do cholesterol medication.  Your kidney, liver, HIV test,Prostate cancer screening test are all within normal limits.  Your potassium is low which is a common side effect of hydrochlorothiazide.  I recommend you to add more green vegetables, bananas, dried fruits in your diet and we should recheck your potassium in 3 months.    Resulted Orders   Lipid panel reflex to direct LDL Fasting   Result Value Ref Range    Cholesterol 239 (H) <200 mg/dL      Comment:      Desirable:       <200 mg/dl    Triglycerides 95 <150 mg/dL      Comment:      Fasting specimen    HDL Cholesterol 51 >39 mg/dL    LDL Cholesterol Calculated 169 (H) <100 mg/dL      Comment:      Above desirable:  100-129 mg/dl  Borderline High:  130-159 mg/dL  High:             160-189 mg/dL  Very high:       >189 mg/dl      Non HDL Cholesterol 188 (H) <130 mg/dL      Comment:      Above Desirable:  130-159 mg/dl  Borderline high:  160-189 mg/dl  High:             190-219 mg/dl  Very high:       >219 mg/dl     HIV Screening   Result Value Ref Range    HIV Antigen Antibody Combo Nonreactive NR^Nonreactive          Comment:      HIV-1 p24 Ag & HIV-1/HIV-2 Ab Not Detected   COMPREHENSIVE METABOLIC PANEL   Result Value Ref Range    Sodium 139 133 - 144 mmol/L    Potassium 3.2 (L) 3.4 - 5.3 mmol/L    Chloride 103 94 - 109 mmol/L    Carbon Dioxide 27 20 - 32 mmol/L    Anion Gap 9 3 - 14 mmol/L    Glucose 79 70 - 99 mg/dL      Comment:       Fasting specimen    Urea Nitrogen 24 7 - 30 mg/dL    Creatinine 0.95 0.66 - 1.25 mg/dL    GFR Estimate 79 >60 mL/min/1.7m2      Comment:      Non  GFR Calc    GFR Estimate If Black >90 >60 mL/min/1.7m2      Comment:       GFR Calc    Calcium 9.5 8.5 - 10.1 mg/dL    Bilirubin Total 0.8 0.2 - 1.3 mg/dL    Albumin 4.1 3.4 - 5.0 g/dL    Protein Total 7.9 6.8 - 8.8 g/dL    Alkaline Phosphatase 55 40 - 150 U/L    ALT 27 0 - 70 U/L    AST 24 0 - 45 U/L   PSA, screen   Result Value Ref Range    PSA 2.22 0 - 4 ug/L      Comment:      Assay Method:  Chemiluminescence using Siemens Vista analyzer       If you have any questions or concerns, please call the clinic at the number listed above.       Sincerely,        Cm Salmon MD/nr

## 2018-10-17 LAB
ALBUMIN SERPL-MCNC: 4.1 G/DL (ref 3.4–5)
ALP SERPL-CCNC: 55 U/L (ref 40–150)
ALT SERPL W P-5'-P-CCNC: 27 U/L (ref 0–70)
ANION GAP SERPL CALCULATED.3IONS-SCNC: 9 MMOL/L (ref 3–14)
AST SERPL W P-5'-P-CCNC: 24 U/L (ref 0–45)
BILIRUB SERPL-MCNC: 0.8 MG/DL (ref 0.2–1.3)
BUN SERPL-MCNC: 24 MG/DL (ref 7–30)
CALCIUM SERPL-MCNC: 9.5 MG/DL (ref 8.5–10.1)
CHLORIDE SERPL-SCNC: 103 MMOL/L (ref 94–109)
CHOLEST SERPL-MCNC: 239 MG/DL
CO2 SERPL-SCNC: 27 MMOL/L (ref 20–32)
CREAT SERPL-MCNC: 0.95 MG/DL (ref 0.66–1.25)
GFR SERPL CREATININE-BSD FRML MDRD: 79 ML/MIN/1.7M2
GLUCOSE SERPL-MCNC: 79 MG/DL (ref 70–99)
HDLC SERPL-MCNC: 51 MG/DL
HIV 1+2 AB+HIV1 P24 AG SERPL QL IA: NONREACTIVE
LDLC SERPL CALC-MCNC: 169 MG/DL
NONHDLC SERPL-MCNC: 188 MG/DL
POTASSIUM SERPL-SCNC: 3.2 MMOL/L (ref 3.4–5.3)
PROT SERPL-MCNC: 7.9 G/DL (ref 6.8–8.8)
PSA SERPL-ACNC: 2.22 UG/L (ref 0–4)
SODIUM SERPL-SCNC: 139 MMOL/L (ref 133–144)
TRIGL SERPL-MCNC: 95 MG/DL

## 2018-10-17 NOTE — PROGRESS NOTES
Your cholesterol is getting worse.  Your risk of developing heart attack and stroke and next 10 years is about 15% which is quite high for your age.  I do recommend you to restart your cholesterol medication.  I know you have in the past use cholesterol medication but wishes not to use it anymore.  Please let me know and I can send a new prescription if you choose to do cholesterol medication.  Your kidney, liver, HIV test,Prostate cancer screening test are all within normal limits.  Your potassium is low which is a common side effect of hydrochlorothiazide.  I recommend you to add more green vegetables, bananas, dried fruits in your diet and we should recheck your potassium in 3 months.

## 2018-12-14 ENCOUNTER — TELEPHONE (OUTPATIENT)
Dept: FAMILY MEDICINE | Facility: CLINIC | Age: 63
End: 2018-12-14

## 2018-12-14 NOTE — TELEPHONE ENCOUNTER
Reason for Call:  Medication or medication refill:    Do you use a [facility]?  Name of the pharmacy and phone number for the current request:  [facility] - [phone]    Name of the medication requested: topiramate (TOPAMAX) 50 MG tablet    Other request: Pt called requesting a refill on this medication. He said he is out. Please Advise thank you    Can we leave a detailed message on this number? YES    Phone number patient can be reached at: Home number on file [phone] (home)    Best Time: anytime    Call taken on 12/14/2018 at 10:10 AM by Lois Low

## 2018-12-28 ENCOUNTER — TELEPHONE (OUTPATIENT)
Dept: FAMILY MEDICINE | Facility: CLINIC | Age: 63
End: 2018-12-28

## 2018-12-28 NOTE — TELEPHONE ENCOUNTER
Routing to provider - Viky ZAPATA: Medication question:  Does patient need to hold any medications for colonoscopy?  How many days prior to procedure should he hold medications?  When can he restart medications?    Please review all medication but patient specifically asked about - Aleve & aspirin    B/A:  Colonoscopy scheduled for 1/4/18 for screening of colon cancer    Last office visit 10/16/18 - Viky LIAO:  please review and advise as appropriate    Thank you,  Jordy Bell RN

## 2019-01-04 ENCOUNTER — TRANSFERRED RECORDS (OUTPATIENT)
Dept: HEALTH INFORMATION MANAGEMENT | Facility: CLINIC | Age: 64
End: 2019-01-04

## 2019-01-04 ENCOUNTER — HOSPITAL ENCOUNTER (OUTPATIENT)
Facility: CLINIC | Age: 64
Discharge: HOME | End: 2019-01-04
Attending: COLON & RECTAL SURGERY | Admitting: COLON & RECTAL SURGERY
Payer: COMMERCIAL

## 2019-01-04 VITALS
HEIGHT: 66 IN | OXYGEN SATURATION: 93 % | HEART RATE: 71 BPM | WEIGHT: 200 LBS | BODY MASS INDEX: 32.14 KG/M2 | SYSTOLIC BLOOD PRESSURE: 142 MMHG | DIASTOLIC BLOOD PRESSURE: 102 MMHG | RESPIRATION RATE: 20 BRPM

## 2019-01-04 LAB — COLONOSCOPY: NORMAL

## 2019-01-04 PROCEDURE — 45380 COLONOSCOPY AND BIOPSY: CPT | Performed by: COLON & RECTAL SURGERY

## 2019-01-04 PROCEDURE — 88305 TISSUE EXAM BY PATHOLOGIST: CPT | Performed by: COLON & RECTAL SURGERY

## 2019-01-04 PROCEDURE — 25000128 H RX IP 250 OP 636: Performed by: COLON & RECTAL SURGERY

## 2019-01-04 PROCEDURE — 99153 MOD SED SAME PHYS/QHP EA: CPT

## 2019-01-04 PROCEDURE — G0500 MOD SEDAT ENDO SERVICE >5YRS: HCPCS | Performed by: COLON & RECTAL SURGERY

## 2019-01-04 PROCEDURE — 88305 TISSUE EXAM BY PATHOLOGIST: CPT | Mod: 26,59 | Performed by: COLON & RECTAL SURGERY

## 2019-01-04 RX ORDER — FLUMAZENIL 0.1 MG/ML
0.2 INJECTION, SOLUTION INTRAVENOUS
Status: CANCELLED | OUTPATIENT
Start: 2019-01-04 | End: 2019-01-04

## 2019-01-04 RX ORDER — ONDANSETRON 2 MG/ML
4 INJECTION INTRAMUSCULAR; INTRAVENOUS EVERY 6 HOURS PRN
Status: CANCELLED | OUTPATIENT
Start: 2019-01-04

## 2019-01-04 RX ORDER — ONDANSETRON 4 MG/1
4 TABLET, ORALLY DISINTEGRATING ORAL EVERY 6 HOURS PRN
Status: CANCELLED | OUTPATIENT
Start: 2019-01-04

## 2019-01-04 RX ORDER — FENTANYL CITRATE 50 UG/ML
INJECTION, SOLUTION INTRAMUSCULAR; INTRAVENOUS PRN
Status: DISCONTINUED | OUTPATIENT
Start: 2019-01-04 | End: 2019-01-04 | Stop reason: HOSPADM

## 2019-01-04 RX ORDER — ONDANSETRON 2 MG/ML
4 INJECTION INTRAMUSCULAR; INTRAVENOUS
Status: DISCONTINUED | OUTPATIENT
Start: 2019-01-04 | End: 2019-01-04 | Stop reason: HOSPADM

## 2019-01-04 RX ORDER — NALOXONE HYDROCHLORIDE 0.4 MG/ML
.1-.4 INJECTION, SOLUTION INTRAMUSCULAR; INTRAVENOUS; SUBCUTANEOUS
Status: CANCELLED | OUTPATIENT
Start: 2019-01-04 | End: 2019-01-05

## 2019-01-04 RX ORDER — LIDOCAINE 40 MG/G
CREAM TOPICAL
Status: DISCONTINUED | OUTPATIENT
Start: 2019-01-04 | End: 2019-01-04 | Stop reason: HOSPADM

## 2019-01-04 SDOH — HEALTH STABILITY: MENTAL HEALTH: HOW OFTEN DO YOU HAVE A DRINK CONTAINING ALCOHOL?: NEVER

## 2019-01-04 ASSESSMENT — MIFFLIN-ST. JEOR: SCORE: 1634.94

## 2019-01-04 NOTE — H&P
Pre-Endoscopy History and Physical     Sabino Espinal MRN# [id number]   YOB: 1955 Age: 63 year old     Date of Procedure: 1/4/2019  Primary care provider: Cm Salmon  Type of Endoscopy: Colonoscopy  Reason for Procedure: screening  Type of Anesthesia Anticipated: Moderate Sedation    HPI:    Sabino is a 63 year old male who will be undergoing the above procedure.      A history and physical has been performed. The patient's medications and allergies have been reviewed. The risks and benefits of the procedure and the sedation options and risks were discussed with the patient.  All questions were answered and informed consent was obtained.      He denies a personal or family history of anesthesia complications or bleeding disorders.     Allergies   Allergen Reactions     Oxycodone-Acetaminophen Rash          No current facility-administered medications on file prior to encounter.   Current Outpatient Medications on File Prior to Encounter:  aspirin 81 MG tablet Take 81 mg by mouth daily       Patient Active Problem List   Diagnosis     Inguinal hernia     Generalized osteoarthrosis, unspecified site     Esophageal reflux     Hemochromatosis     HYPERLIPIDEMIA LDL GOAL <130     Hypertension goal BP (blood pressure) < 140/90     Tinnitus, bilateral     DJD (degenerative joint disease) of knee     History of basal cell carcinoma     Cervicalgia     Tension headache        Past Medical History:   Diagnosis Date     Basal cell carcinoma      Disorders of iron metabolism      DJD (degenerative joint disease) of knee     Right Knee hurts worse     Esophageal reflux      Hemochromatosis      Hyperlipidemia LDL goal <130 5/9/2010     Hypertension goal BP (blood pressure) < 140/90      Tinnitus, bilateral     Has seen ENT        Past Surgical History:   Procedure Laterality Date     COLONOSCOPY  2/14/2008    Normal     HERNIA REPAIR, INGUINAL RT/LT  1/3/2008    Left Hernia x 2     MOHS  "MICROGRAPHIC PROCEDURE       ORTHOPEDIC SURGERY      cong knee replaced       Social History     Tobacco Use     Smoking status: Former Smoker     Packs/day: 1.00     Years: 10.00     Pack years: 10.00     Types: Cigarettes     Smokeless tobacco: Never Used     Tobacco comment: quit at age 25   Substance Use Topics     Alcohol use: No     Frequency: Never     Comment: rarely        Family History   Problem Relation Age of Onset     Diabetes Mother      Hypertension Mother      Hypertension Father      Cerebrovascular Disease Father      Thyroid Disease Sister         Graves Disease     Thyroid Disease Sister         Graves Disease     Skin Cancer No family hx of        REVIEW OF SYSTEMS:     5 point ROS negative except as noted above in HPI, including Gen., Resp., CV, GI &  system review.      PHYSICAL EXAM:   BP (!) 154/97   Pulse 83   Resp 11   Ht 1.676 m (5' 6\")   Wt 90.7 kg (200 lb)   SpO2 94%   BMI 32.28 kg/m   Estimated body mass index is 32.28 kg/m  as calculated from the following:    Height as of this encounter: 1.676 m (5' 6\").    Weight as of this encounter: 90.7 kg (200 lb).   GENERAL APPEARANCE: healthy and alert  MENTAL STATUS: alert  AIRWAY EXAM: Mallampatti Class I (visualization of the soft palate, fauces, uvula, anterior and posterior pillars)  RESP: lungs clear to auscultation - no rales, rhonchi or wheezes  CV: regular rates and rhythm      IMPRESSION   ASA Class 2 - Mild systemic disease        PLAN:     Plan for colonoscopy. We discussed the risks, benefits and alternatives and the patient wished to proceed.    The above has been forwarded to the consulting provider.      Glenna Giles MD  [facility]  Phone: [phone]  Fax: [phone]  January 4, 2019    "

## 2019-01-07 LAB — COPATH REPORT: NORMAL

## 2019-06-18 DIAGNOSIS — G44.209 TENSION HEADACHE: ICD-10-CM

## 2019-06-18 RX ORDER — TOPIRAMATE 50 MG/1
50 TABLET, FILM COATED ORAL 2 TIMES DAILY
Qty: 180 TABLET | Refills: 1 | Status: SHIPPED | OUTPATIENT
Start: 2019-06-18

## 2019-06-18 NOTE — TELEPHONE ENCOUNTER
Pended med and sent to refill pool.    10/16/18 was last OV.  This med was not addressed.  Can this med be sent in for for another 6 months?  Med and pharmacy pended.   LILLY Stock

## 2019-06-18 NOTE — TELEPHONE ENCOUNTER
Reason for Call:  Medication or medication refill:    Do you use a [facility]?  Name of the pharmacy and phone number for the current request:  [facility] - [phone]    Name of the medication requested: topiramate (TOPAMAX) 50 MG tablet    Other request: Pt states he has 4 days left of pills.     Can we leave a detailed message on this number? YES    Phone number patient can be reached at: Cell number on file:    Telephone Information:   Mobile [phone]       Best Time: anytime    Call taken on 6/18/2019 at 9:07 AM by Diandra Lawson

## (undated) RX ORDER — LIDOCAINE 50 MG/G
OINTMENT TOPICAL
Status: DISPENSED
Start: 2017-01-10

## (undated) RX ORDER — FENTANYL CITRATE 50 UG/ML
INJECTION, SOLUTION INTRAMUSCULAR; INTRAVENOUS
Status: DISPENSED
Start: 2019-01-04